# Patient Record
Sex: MALE | Race: WHITE | Employment: FULL TIME | URBAN - NONMETROPOLITAN AREA
[De-identification: names, ages, dates, MRNs, and addresses within clinical notes are randomized per-mention and may not be internally consistent; named-entity substitution may affect disease eponyms.]

---

## 2017-04-03 ENCOUNTER — HOSPITAL ENCOUNTER (EMERGENCY)
Age: 37
Discharge: HOME | End: 2017-04-03
Attending: EMERGENCY MEDICINE
Payer: COMMERCIAL

## 2017-04-03 ENCOUNTER — APPOINTMENT (OUTPATIENT)
Dept: CT IMAGING | Age: 37
End: 2017-04-03
Payer: COMMERCIAL

## 2017-04-03 VITALS
RESPIRATION RATE: 12 BRPM | TEMPERATURE: 97.2 F | DIASTOLIC BLOOD PRESSURE: 74 MMHG | OXYGEN SATURATION: 98 % | WEIGHT: 280 LBS | HEART RATE: 84 BPM | HEIGHT: 69 IN | SYSTOLIC BLOOD PRESSURE: 119 MMHG | BODY MASS INDEX: 41.47 KG/M2

## 2017-04-03 DIAGNOSIS — K52.9 GASTROENTERITIS: Primary | ICD-10-CM

## 2017-04-03 LAB
-: NORMAL
ABSOLUTE EOS #: 0.1 K/UL (ref 0–0.4)
ABSOLUTE LYMPH #: 0.5 K/UL (ref 1–4.8)
ABSOLUTE MONO #: 0.9 K/UL (ref 0.1–1.2)
ALBUMIN SERPL-MCNC: 4.2 G/DL (ref 3.5–5.2)
ALBUMIN/GLOBULIN RATIO: 1.6 (ref 1–2.5)
ALP BLD-CCNC: 78 U/L (ref 40–129)
ALT SERPL-CCNC: 15 U/L (ref 5–41)
AMORPHOUS: NORMAL
ANION GAP SERPL CALCULATED.3IONS-SCNC: 11 MMOL/L (ref 9–17)
AST SERPL-CCNC: 14 U/L
BACTERIA: NORMAL
BASOPHILS # BLD: 0 % (ref 0–2)
BASOPHILS ABSOLUTE: 0 K/UL (ref 0–0.2)
BILIRUB SERPL-MCNC: 0.54 MG/DL (ref 0.3–1.2)
BILIRUBIN DIRECT: 0.13 MG/DL
BILIRUBIN URINE: NEGATIVE
BILIRUBIN, INDIRECT: 0.41 MG/DL (ref 0–1)
BUN BLDV-MCNC: 17 MG/DL (ref 6–20)
BUN/CREAT BLD: 23 (ref 9–20)
CALCIUM SERPL-MCNC: 8.6 MG/DL (ref 8.6–10.4)
CASTS UA: NORMAL /LPF (ref 0–2)
CHLORIDE BLD-SCNC: 106 MMOL/L (ref 98–107)
CO2: 24 MMOL/L (ref 20–31)
COLOR: ABNORMAL
COMMENT UA: ABNORMAL
CREAT SERPL-MCNC: 0.73 MG/DL (ref 0.7–1.2)
CRYSTALS, UA: NORMAL /HPF
DIFFERENTIAL TYPE: ABNORMAL
EOSINOPHILS RELATIVE PERCENT: 1 % (ref 1–4)
EPITHELIAL CELLS UA: NORMAL /HPF (ref 0–5)
GFR AFRICAN AMERICAN: >60 ML/MIN
GFR NON-AFRICAN AMERICAN: >60 ML/MIN
GFR SERPL CREATININE-BSD FRML MDRD: ABNORMAL ML/MIN/{1.73_M2}
GFR SERPL CREATININE-BSD FRML MDRD: ABNORMAL ML/MIN/{1.73_M2}
GLOBULIN: 2.6 G/DL (ref 1.5–3.8)
GLUCOSE BLD-MCNC: 130 MG/DL (ref 70–99)
GLUCOSE URINE: NEGATIVE
HCT VFR BLD CALC: 48.9 % (ref 41–53)
HEMOGLOBIN: 15.7 G/DL (ref 13.5–17.5)
KETONES, URINE: NEGATIVE
LEUKOCYTE ESTERASE, URINE: NEGATIVE
LIPASE: 19 U/L (ref 13–60)
LYMPHOCYTES # BLD: 3 % (ref 24–44)
MCH RBC QN AUTO: 27.3 PG (ref 26–34)
MCHC RBC AUTO-ENTMCNC: 32 G/DL (ref 31–37)
MCV RBC AUTO: 85.3 FL (ref 80–100)
MONOCYTES # BLD: 6 % (ref 1–7)
MUCUS: NORMAL
NITRITE, URINE: NEGATIVE
OTHER OBSERVATIONS UA: NORMAL
PDW BLD-RTO: 14.5 % (ref 11–14.5)
PH UA: 5 (ref 5–6)
PLATELET # BLD: 254 K/UL (ref 140–450)
PLATELET ESTIMATE: ABNORMAL
PMV BLD AUTO: 8.2 FL (ref 6–12)
POTASSIUM SERPL-SCNC: 4 MMOL/L (ref 3.7–5.3)
PROTEIN UA: NEGATIVE
RBC # BLD: 5.74 M/UL (ref 4.5–5.9)
RBC # BLD: ABNORMAL 10*6/UL
RBC UA: NORMAL /HPF (ref 0–4)
RENAL EPITHELIAL, UA: NORMAL /HPF
SEG NEUTROPHILS: 90 % (ref 36–66)
SEGMENTED NEUTROPHILS ABSOLUTE COUNT: 14.4 K/UL (ref 1.8–7.7)
SODIUM BLD-SCNC: 141 MMOL/L (ref 135–144)
SPECIFIC GRAVITY UA: 1 (ref 1.01–1.02)
TOTAL PROTEIN: 6.8 G/DL (ref 6.4–8.3)
TRICHOMONAS: NORMAL
TURBIDITY: ABNORMAL
URINE HGB: NEGATIVE
UROBILINOGEN, URINE: NORMAL
WBC # BLD: 15.9 K/UL (ref 3.5–11)
WBC # BLD: ABNORMAL 10*3/UL
WBC UA: NORMAL /HPF (ref 0–4)
YEAST: NORMAL

## 2017-04-03 PROCEDURE — 83690 ASSAY OF LIPASE: CPT

## 2017-04-03 PROCEDURE — 36415 COLL VENOUS BLD VENIPUNCTURE: CPT

## 2017-04-03 PROCEDURE — 80048 BASIC METABOLIC PNL TOTAL CA: CPT

## 2017-04-03 PROCEDURE — 2580000003 HC RX 258: Performed by: EMERGENCY MEDICINE

## 2017-04-03 PROCEDURE — 80076 HEPATIC FUNCTION PANEL: CPT

## 2017-04-03 PROCEDURE — 96375 TX/PRO/DX INJ NEW DRUG ADDON: CPT

## 2017-04-03 PROCEDURE — 6360000002 HC RX W HCPCS: Performed by: EMERGENCY MEDICINE

## 2017-04-03 PROCEDURE — 99284 EMERGENCY DEPT VISIT MOD MDM: CPT

## 2017-04-03 PROCEDURE — 74177 CT ABD & PELVIS W/CONTRAST: CPT

## 2017-04-03 PROCEDURE — 85025 COMPLETE CBC W/AUTO DIFF WBC: CPT

## 2017-04-03 PROCEDURE — 96372 THER/PROPH/DIAG INJ SC/IM: CPT

## 2017-04-03 PROCEDURE — 74177 CT ABD & PELVIS W/CONTRAST: CPT | Performed by: RADIOLOGY

## 2017-04-03 PROCEDURE — 81001 URINALYSIS AUTO W/SCOPE: CPT

## 2017-04-03 PROCEDURE — 96374 THER/PROPH/DIAG INJ IV PUSH: CPT

## 2017-04-03 PROCEDURE — 6360000004 HC RX CONTRAST MEDICATION: Performed by: EMERGENCY MEDICINE

## 2017-04-03 RX ORDER — DIPHENHYDRAMINE HYDROCHLORIDE 50 MG/ML
25 INJECTION INTRAMUSCULAR; INTRAVENOUS ONCE
Status: COMPLETED | OUTPATIENT
Start: 2017-04-03 | End: 2017-04-03

## 2017-04-03 RX ORDER — ONDANSETRON 4 MG/1
4 TABLET, ORALLY DISINTEGRATING ORAL EVERY 8 HOURS PRN
Qty: 12 TABLET | Refills: 0 | Status: SHIPPED | OUTPATIENT
Start: 2017-04-03 | End: 2017-04-12

## 2017-04-03 RX ORDER — ONDANSETRON 2 MG/ML
4 INJECTION INTRAMUSCULAR; INTRAVENOUS ONCE
Status: COMPLETED | OUTPATIENT
Start: 2017-04-03 | End: 2017-04-03

## 2017-04-03 RX ORDER — DICYCLOMINE HYDROCHLORIDE 10 MG/ML
20 INJECTION INTRAMUSCULAR ONCE
Status: COMPLETED | OUTPATIENT
Start: 2017-04-03 | End: 2017-04-03

## 2017-04-03 RX ORDER — 0.9 % SODIUM CHLORIDE 0.9 %
1000 INTRAVENOUS SOLUTION INTRAVENOUS ONCE
Status: COMPLETED | OUTPATIENT
Start: 2017-04-03 | End: 2017-04-03

## 2017-04-03 RX ORDER — PROMETHAZINE HYDROCHLORIDE 25 MG/ML
12.5 INJECTION, SOLUTION INTRAMUSCULAR; INTRAVENOUS ONCE
Status: COMPLETED | OUTPATIENT
Start: 2017-04-03 | End: 2017-04-03

## 2017-04-03 RX ADMIN — PROMETHAZINE HYDROCHLORIDE 12.5 MG: 25 INJECTION, SOLUTION INTRAMUSCULAR; INTRAVENOUS at 18:52

## 2017-04-03 RX ADMIN — IOVERSOL 130 ML: 741 INJECTION INTRA-ARTERIAL; INTRAVENOUS at 16:50

## 2017-04-03 RX ADMIN — DICYCLOMINE HYDROCHLORIDE 20 MG: 20 INJECTION, SOLUTION INTRAMUSCULAR at 15:40

## 2017-04-03 RX ADMIN — SODIUM CHLORIDE 1000 ML: 9 INJECTION, SOLUTION INTRAVENOUS at 18:09

## 2017-04-03 RX ADMIN — DIPHENHYDRAMINE HYDROCHLORIDE 25 MG: 50 INJECTION, SOLUTION INTRAMUSCULAR; INTRAVENOUS at 18:48

## 2017-04-03 RX ADMIN — ONDANSETRON 4 MG: 2 INJECTION INTRAMUSCULAR; INTRAVENOUS at 15:39

## 2017-04-03 RX ADMIN — SODIUM CHLORIDE 1000 ML: 9 INJECTION, SOLUTION INTRAVENOUS at 15:38

## 2017-04-03 ASSESSMENT — PAIN SCALES - GENERAL
PAINLEVEL_OUTOF10: 6
PAINLEVEL_OUTOF10: 5
PAINLEVEL_OUTOF10: 9

## 2017-04-03 ASSESSMENT — PAIN DESCRIPTION - FREQUENCY: FREQUENCY: CONTINUOUS

## 2017-04-03 ASSESSMENT — PAIN DESCRIPTION - DESCRIPTORS
DESCRIPTORS: THROBBING
DESCRIPTORS: THROBBING

## 2017-04-03 ASSESSMENT — PAIN DESCRIPTION - ORIENTATION
ORIENTATION: UPPER
ORIENTATION: UPPER

## 2017-04-03 ASSESSMENT — PAIN DESCRIPTION - ONSET: ONSET: ON-GOING

## 2017-04-03 ASSESSMENT — PAIN DESCRIPTION - PAIN TYPE
TYPE: ACUTE PAIN
TYPE: ACUTE PAIN

## 2017-04-03 ASSESSMENT — PAIN DESCRIPTION - LOCATION
LOCATION: ABDOMEN
LOCATION: ABDOMEN

## 2017-04-03 ASSESSMENT — PAIN DESCRIPTION - PROGRESSION: CLINICAL_PROGRESSION: NOT CHANGED

## 2017-04-12 ENCOUNTER — OFFICE VISIT (OUTPATIENT)
Dept: FAMILY MEDICINE CLINIC | Age: 37
End: 2017-04-12
Payer: COMMERCIAL

## 2017-04-12 VITALS
HEART RATE: 75 BPM | TEMPERATURE: 97.9 F | BODY MASS INDEX: 41.53 KG/M2 | DIASTOLIC BLOOD PRESSURE: 78 MMHG | WEIGHT: 280.4 LBS | OXYGEN SATURATION: 97 % | SYSTOLIC BLOOD PRESSURE: 118 MMHG | HEIGHT: 69 IN

## 2017-04-12 DIAGNOSIS — Z00.00 VISIT FOR WELL MAN HEALTH CHECK: Primary | ICD-10-CM

## 2017-04-12 DIAGNOSIS — B35.3 TINEA PEDIS, LEFT: ICD-10-CM

## 2017-04-12 PROCEDURE — 99395 PREV VISIT EST AGE 18-39: CPT | Performed by: FAMILY MEDICINE

## 2017-04-12 RX ORDER — CLOTRIMAZOLE AND BETAMETHASONE DIPROPIONATE 10; .64 MG/G; MG/G
CREAM TOPICAL
Qty: 45 G | Refills: 1 | Status: SHIPPED | OUTPATIENT
Start: 2017-04-12 | End: 2018-10-18 | Stop reason: ALTCHOICE

## 2017-04-12 ASSESSMENT — ENCOUNTER SYMPTOMS
COUGH: 0
CHEST TIGHTNESS: 0
SHORTNESS OF BREATH: 0
CONSTIPATION: 0
WHEEZING: 0
SINUS PRESSURE: 0
DIARRHEA: 0
ABDOMINAL PAIN: 0
BACK PAIN: 0

## 2017-07-03 ENCOUNTER — HOSPITAL ENCOUNTER (EMERGENCY)
Age: 37
Discharge: HOME | End: 2017-07-03
Attending: SPECIALIST
Payer: COMMERCIAL

## 2017-07-03 ENCOUNTER — APPOINTMENT (OUTPATIENT)
Dept: GENERAL RADIOLOGY | Age: 37
End: 2017-07-03
Payer: COMMERCIAL

## 2017-07-03 VITALS
TEMPERATURE: 97.9 F | HEART RATE: 69 BPM | SYSTOLIC BLOOD PRESSURE: 119 MMHG | DIASTOLIC BLOOD PRESSURE: 82 MMHG | WEIGHT: 275 LBS | RESPIRATION RATE: 19 BRPM | BODY MASS INDEX: 40.61 KG/M2 | OXYGEN SATURATION: 97 %

## 2017-07-03 DIAGNOSIS — R07.9 CHEST PAIN, UNSPECIFIED TYPE: Primary | ICD-10-CM

## 2017-07-03 LAB
ABSOLUTE EOS #: 0.3 K/UL (ref 0–0.4)
ABSOLUTE LYMPH #: 3 K/UL (ref 1–4.8)
ABSOLUTE MONO #: 1.2 K/UL (ref 0.1–1.2)
ANION GAP SERPL CALCULATED.3IONS-SCNC: 12 MMOL/L (ref 9–17)
BASOPHILS # BLD: 1 %
BASOPHILS ABSOLUTE: 0.1 K/UL (ref 0–0.2)
BUN BLDV-MCNC: 17 MG/DL (ref 6–20)
BUN/CREAT BLD: 23 (ref 9–20)
CALCIUM SERPL-MCNC: 8.9 MG/DL (ref 8.6–10.4)
CHLORIDE BLD-SCNC: 104 MMOL/L (ref 98–107)
CO2: 25 MMOL/L (ref 20–31)
CREAT SERPL-MCNC: 0.73 MG/DL (ref 0.7–1.2)
D DIMER: <100 NG/ML
DIFFERENTIAL TYPE: ABNORMAL
EKG ATRIAL RATE: 71 BPM
EKG P AXIS: 54 DEGREES
EKG P-R INTERVAL: 140 MS
EKG Q-T INTERVAL: 380 MS
EKG QRS DURATION: 82 MS
EKG QTC CALCULATION (BAZETT): 412 MS
EKG R AXIS: 13 DEGREES
EKG T AXIS: 27 DEGREES
EKG VENTRICULAR RATE: 71 BPM
EOSINOPHILS RELATIVE PERCENT: 3 %
GFR AFRICAN AMERICAN: >60 ML/MIN
GFR NON-AFRICAN AMERICAN: >60 ML/MIN
GFR SERPL CREATININE-BSD FRML MDRD: ABNORMAL ML/MIN/{1.73_M2}
GFR SERPL CREATININE-BSD FRML MDRD: ABNORMAL ML/MIN/{1.73_M2}
GLUCOSE BLD-MCNC: 100 MG/DL (ref 70–99)
HCT VFR BLD CALC: 46.5 % (ref 41–53)
HEMOGLOBIN: 15 G/DL (ref 13.5–17.5)
LYMPHOCYTES # BLD: 21 %
MCH RBC QN AUTO: 27.4 PG (ref 26–34)
MCHC RBC AUTO-ENTMCNC: 32.3 G/DL (ref 31–37)
MCV RBC AUTO: 84.8 FL (ref 80–100)
MONOCYTES # BLD: 9 %
PDW BLD-RTO: 14.3 % (ref 11–14.5)
PLATELET # BLD: 238 K/UL (ref 140–450)
PLATELET ESTIMATE: ABNORMAL
PMV BLD AUTO: 7.9 FL (ref 6–12)
POTASSIUM SERPL-SCNC: 4.2 MMOL/L (ref 3.7–5.3)
RBC # BLD: 5.48 M/UL (ref 4.5–5.9)
RBC # BLD: ABNORMAL 10*6/UL
SEG NEUTROPHILS: 66 %
SEGMENTED NEUTROPHILS ABSOLUTE COUNT: 9.4 K/UL (ref 1.8–7.7)
SODIUM BLD-SCNC: 141 MMOL/L (ref 135–144)
TROPONIN INTERP: NORMAL
TROPONIN T: <0.03 NG/ML
WBC # BLD: 14 K/UL (ref 3.5–11)
WBC # BLD: ABNORMAL 10*3/UL

## 2017-07-03 PROCEDURE — 84484 ASSAY OF TROPONIN QUANT: CPT

## 2017-07-03 PROCEDURE — 71020 XR CHEST STANDARD TWO VW: CPT | Performed by: RADIOLOGY

## 2017-07-03 PROCEDURE — 80048 BASIC METABOLIC PNL TOTAL CA: CPT

## 2017-07-03 PROCEDURE — 6370000000 HC RX 637 (ALT 250 FOR IP): Performed by: SPECIALIST

## 2017-07-03 PROCEDURE — 71020 XR CHEST STANDARD TWO VW: CPT

## 2017-07-03 PROCEDURE — 85379 FIBRIN DEGRADATION QUANT: CPT

## 2017-07-03 PROCEDURE — 96374 THER/PROPH/DIAG INJ IV PUSH: CPT

## 2017-07-03 PROCEDURE — 93005 ELECTROCARDIOGRAM TRACING: CPT

## 2017-07-03 PROCEDURE — 99285 EMERGENCY DEPT VISIT HI MDM: CPT

## 2017-07-03 PROCEDURE — 6360000002 HC RX W HCPCS: Performed by: EMERGENCY MEDICINE

## 2017-07-03 PROCEDURE — 36415 COLL VENOUS BLD VENIPUNCTURE: CPT

## 2017-07-03 PROCEDURE — 85025 COMPLETE CBC W/AUTO DIFF WBC: CPT

## 2017-07-03 RX ORDER — IBUPROFEN 800 MG/1
800 TABLET ORAL EVERY 8 HOURS PRN
Qty: 30 TABLET | Refills: 0 | Status: SHIPPED | OUTPATIENT
Start: 2017-07-03 | End: 2018-10-18 | Stop reason: ALTCHOICE

## 2017-07-03 RX ORDER — KETOROLAC TROMETHAMINE 30 MG/ML
30 INJECTION, SOLUTION INTRAMUSCULAR; INTRAVENOUS ONCE
Status: COMPLETED | OUTPATIENT
Start: 2017-07-03 | End: 2017-07-03

## 2017-07-03 RX ORDER — ASPIRIN 81 MG/1
324 TABLET, CHEWABLE ORAL ONCE
Status: COMPLETED | OUTPATIENT
Start: 2017-07-03 | End: 2017-07-03

## 2017-07-03 RX ADMIN — ASPIRIN 81 MG 324 MG: 81 TABLET ORAL at 07:33

## 2017-07-03 RX ADMIN — KETOROLAC TROMETHAMINE 30 MG: 30 INJECTION, SOLUTION INTRAMUSCULAR at 08:50

## 2017-07-03 ASSESSMENT — PAIN SCALES - GENERAL
PAINLEVEL_OUTOF10: 5
PAINLEVEL_OUTOF10: 7

## 2017-07-03 ASSESSMENT — PAIN DESCRIPTION - ORIENTATION: ORIENTATION: LEFT

## 2017-07-03 ASSESSMENT — PAIN DESCRIPTION - DESCRIPTORS: DESCRIPTORS: SHARP

## 2017-07-03 ASSESSMENT — PAIN DESCRIPTION - LOCATION: LOCATION: CHEST

## 2018-10-18 ENCOUNTER — OFFICE VISIT (OUTPATIENT)
Dept: PRIMARY CARE CLINIC | Age: 38
End: 2018-10-18
Payer: COMMERCIAL

## 2018-10-18 VITALS
WEIGHT: 285.9 LBS | HEART RATE: 76 BPM | DIASTOLIC BLOOD PRESSURE: 84 MMHG | HEIGHT: 69 IN | BODY MASS INDEX: 42.34 KG/M2 | TEMPERATURE: 98 F | SYSTOLIC BLOOD PRESSURE: 138 MMHG

## 2018-10-18 DIAGNOSIS — L03.012 PARONYCHIA OF FINGER OF LEFT HAND: Primary | ICD-10-CM

## 2018-10-18 PROCEDURE — 1036F TOBACCO NON-USER: CPT | Performed by: NURSE PRACTITIONER

## 2018-10-18 PROCEDURE — G8417 CALC BMI ABV UP PARAM F/U: HCPCS | Performed by: NURSE PRACTITIONER

## 2018-10-18 PROCEDURE — G8484 FLU IMMUNIZE NO ADMIN: HCPCS | Performed by: NURSE PRACTITIONER

## 2018-10-18 PROCEDURE — G8427 DOCREV CUR MEDS BY ELIG CLIN: HCPCS | Performed by: NURSE PRACTITIONER

## 2018-10-18 PROCEDURE — 99213 OFFICE O/P EST LOW 20 MIN: CPT | Performed by: NURSE PRACTITIONER

## 2018-10-18 RX ORDER — CEPHALEXIN 500 MG/1
500 CAPSULE ORAL 3 TIMES DAILY
Qty: 30 CAPSULE | Refills: 0 | Status: SHIPPED | OUTPATIENT
Start: 2018-10-18 | End: 2018-10-28

## 2018-10-18 ASSESSMENT — PATIENT HEALTH QUESTIONNAIRE - PHQ9
1. LITTLE INTEREST OR PLEASURE IN DOING THINGS: 0
SUM OF ALL RESPONSES TO PHQ QUESTIONS 1-9: 0
SUM OF ALL RESPONSES TO PHQ QUESTIONS 1-9: 0
2. FEELING DOWN, DEPRESSED OR HOPELESS: 0
SUM OF ALL RESPONSES TO PHQ9 QUESTIONS 1 & 2: 0

## 2018-10-18 NOTE — PROGRESS NOTES
Subjective:      Patient ID: Dian Gonzalez is a 45 y.o. male. Right index finger cuticle swollen, red, tender. Bit nail approx 2 days ago and symptoms started shortly after. Past Medical History:   Diagnosis Date    Chronic abdominal pain     Chronic diarrhea     Constipation     Internal hemorrhoids     Obesity        Past Surgical History:   Procedure Laterality Date    APPENDECTOMY      COLONOSCOPY  09/28/2012    Internal hemorrhoids.  WISDOM TOOTH EXTRACTION         Social History     Social History    Marital status:      Spouse name: Rodrigo Gibson Number of children: 1    Years of education: N/A     Social History Main Topics    Smoking status: Former Smoker     Years: 1.00     Types: Cigarettes     Start date: 9/25/2002     Quit date: 9/25/2003    Smokeless tobacco: Never Used    Alcohol use No    Drug use: No    Sexual activity: Yes     Partners: Female      Comment: wife     Other Topics Concern    None     Social History Narrative    None       Family History   Problem Relation Age of Onset    Colon Cancer Maternal Grandfather     Obesity Father     Diabetes Father         type 2    High Blood Pressure Father     Arthritis Brother         rheumatoid    Crohn's Disease Other        Allergies   Allergen Reactions    Prednisone      Increased heart rate. Current Outpatient Prescriptions   Medication Sig Dispense Refill    cephALEXin (KEFLEX) 500 MG capsule Take 1 capsule by mouth 3 times daily for 10 days 30 capsule 0     No current facility-administered medications for this visit. Review of Systems   Skin:        Right index finger swelling       Objective:   Physical Exam   Constitutional: He is oriented to person, place, and time. He appears well-developed and well-nourished. Cardiovascular: Normal rate and regular rhythm.     Pulmonary/Chest: Effort normal and breath sounds normal.   Neurological: He is alert and oriented to person, place, and

## 2019-10-18 ENCOUNTER — OFFICE VISIT (OUTPATIENT)
Dept: FAMILY MEDICINE CLINIC | Age: 39
End: 2019-10-18
Payer: COMMERCIAL

## 2019-10-18 VITALS
SYSTOLIC BLOOD PRESSURE: 122 MMHG | RESPIRATION RATE: 12 BRPM | DIASTOLIC BLOOD PRESSURE: 80 MMHG | TEMPERATURE: 98 F | WEIGHT: 259 LBS | HEIGHT: 69 IN | HEART RATE: 70 BPM | BODY MASS INDEX: 38.36 KG/M2 | OXYGEN SATURATION: 98 %

## 2019-10-18 DIAGNOSIS — L60.0 INGROWN LEFT GREATER TOENAIL: Primary | ICD-10-CM

## 2019-10-18 DIAGNOSIS — L08.9 TOE INFECTION: ICD-10-CM

## 2019-10-18 PROCEDURE — G8427 DOCREV CUR MEDS BY ELIG CLIN: HCPCS | Performed by: NURSE PRACTITIONER

## 2019-10-18 PROCEDURE — 1036F TOBACCO NON-USER: CPT | Performed by: NURSE PRACTITIONER

## 2019-10-18 PROCEDURE — G8417 CALC BMI ABV UP PARAM F/U: HCPCS | Performed by: NURSE PRACTITIONER

## 2019-10-18 PROCEDURE — 99213 OFFICE O/P EST LOW 20 MIN: CPT | Performed by: NURSE PRACTITIONER

## 2019-10-18 PROCEDURE — G8484 FLU IMMUNIZE NO ADMIN: HCPCS | Performed by: NURSE PRACTITIONER

## 2019-10-18 RX ORDER — SULFAMETHOXAZOLE AND TRIMETHOPRIM 800; 160 MG/1; MG/1
1 TABLET ORAL 2 TIMES DAILY
Qty: 20 TABLET | Refills: 0 | Status: SHIPPED | OUTPATIENT
Start: 2019-10-18 | End: 2020-02-03

## 2019-10-18 ASSESSMENT — PATIENT HEALTH QUESTIONNAIRE - PHQ9
SUM OF ALL RESPONSES TO PHQ QUESTIONS 1-9: 0
2. FEELING DOWN, DEPRESSED OR HOPELESS: 0
SUM OF ALL RESPONSES TO PHQ QUESTIONS 1-9: 0
SUM OF ALL RESPONSES TO PHQ9 QUESTIONS 1 & 2: 0
1. LITTLE INTEREST OR PLEASURE IN DOING THINGS: 0

## 2019-10-29 ENCOUNTER — PROCEDURE VISIT (OUTPATIENT)
Dept: PODIATRY | Age: 39
End: 2019-10-29
Payer: COMMERCIAL

## 2019-10-29 VITALS
BODY MASS INDEX: 39.25 KG/M2 | WEIGHT: 265 LBS | DIASTOLIC BLOOD PRESSURE: 78 MMHG | HEIGHT: 69 IN | SYSTOLIC BLOOD PRESSURE: 132 MMHG | HEART RATE: 84 BPM

## 2019-10-29 DIAGNOSIS — L03.032 PARONYCHIA, TOE, LEFT: ICD-10-CM

## 2019-10-29 DIAGNOSIS — L60.0 OC (ONYCHOCRYPTOSIS): Primary | ICD-10-CM

## 2019-10-29 DIAGNOSIS — L30.9 DERMATITIS OF LEFT FOOT: ICD-10-CM

## 2019-10-29 DIAGNOSIS — L92.9 GRANULOMA OF GREAT TOE: ICD-10-CM

## 2019-10-29 PROCEDURE — 11750 EXCISION NAIL&NAIL MATRIX: CPT | Performed by: PODIATRIST

## 2019-10-29 PROCEDURE — G8417 CALC BMI ABV UP PARAM F/U: HCPCS | Performed by: PODIATRIST

## 2019-10-29 PROCEDURE — 1036F TOBACCO NON-USER: CPT | Performed by: PODIATRIST

## 2019-10-29 PROCEDURE — G8484 FLU IMMUNIZE NO ADMIN: HCPCS | Performed by: PODIATRIST

## 2019-10-29 PROCEDURE — 99203 OFFICE O/P NEW LOW 30 MIN: CPT | Performed by: PODIATRIST

## 2019-10-29 PROCEDURE — G8427 DOCREV CUR MEDS BY ELIG CLIN: HCPCS | Performed by: PODIATRIST

## 2019-10-29 RX ORDER — BETAMETHASONE DIPROPIONATE 0.5 MG/G
CREAM TOPICAL
Qty: 15 G | Refills: 1 | Status: SHIPPED | OUTPATIENT
Start: 2019-10-29 | End: 2020-02-03

## 2020-02-03 ENCOUNTER — OFFICE VISIT (OUTPATIENT)
Dept: PRIMARY CARE CLINIC | Age: 40
End: 2020-02-03
Payer: COMMERCIAL

## 2020-02-03 VITALS
SYSTOLIC BLOOD PRESSURE: 120 MMHG | HEART RATE: 84 BPM | OXYGEN SATURATION: 98 % | DIASTOLIC BLOOD PRESSURE: 74 MMHG | TEMPERATURE: 98.7 F | WEIGHT: 290.4 LBS | BODY MASS INDEX: 42.88 KG/M2

## 2020-02-03 LAB
INFLUENZA A ANTIBODY: ABNORMAL
INFLUENZA B ANTIBODY: ABNORMAL

## 2020-02-03 PROCEDURE — G8427 DOCREV CUR MEDS BY ELIG CLIN: HCPCS | Performed by: FAMILY MEDICINE

## 2020-02-03 PROCEDURE — G8484 FLU IMMUNIZE NO ADMIN: HCPCS | Performed by: FAMILY MEDICINE

## 2020-02-03 PROCEDURE — 99213 OFFICE O/P EST LOW 20 MIN: CPT | Performed by: FAMILY MEDICINE

## 2020-02-03 PROCEDURE — 1036F TOBACCO NON-USER: CPT | Performed by: FAMILY MEDICINE

## 2020-02-03 PROCEDURE — 87804 INFLUENZA ASSAY W/OPTIC: CPT | Performed by: FAMILY MEDICINE

## 2020-02-03 PROCEDURE — G8417 CALC BMI ABV UP PARAM F/U: HCPCS | Performed by: FAMILY MEDICINE

## 2020-02-03 ASSESSMENT — PATIENT HEALTH QUESTIONNAIRE - PHQ9
SUM OF ALL RESPONSES TO PHQ9 QUESTIONS 1 & 2: 0
SUM OF ALL RESPONSES TO PHQ QUESTIONS 1-9: 0
1. LITTLE INTEREST OR PLEASURE IN DOING THINGS: 0
2. FEELING DOWN, DEPRESSED OR HOPELESS: 0
SUM OF ALL RESPONSES TO PHQ QUESTIONS 1-9: 0

## 2020-02-03 ASSESSMENT — ENCOUNTER SYMPTOMS
ALLERGIC/IMMUNOLOGIC NEGATIVE: 1
DIARRHEA: 1
SORE THROAT: 1
RHINORRHEA: 1
COUGH: 1
EYES NEGATIVE: 1

## 2020-02-03 NOTE — PROGRESS NOTES
2/3/2020     Salome Enriquez (:  1980) is a 44 y.o. male, here for evaluation of the following medical concerns:    HPI   Acute urgent care visit for cough and cold symptoms starting Thursday with a cough. Progressing congestion and rhinorrhea after that. Upper teeth sore. Dtr. With influenza B positive last Monday. Feeling achey and tired with a fever. Not checking his temperature. Some chills early on. Some diarrhea. Past Medical History:   Diagnosis Date    Chronic abdominal pain     Chronic diarrhea     Constipation     Internal hemorrhoids     Obesity      Past Surgical History:   Procedure Laterality Date    APPENDECTOMY      COLONOSCOPY  2012    Internal hemorrhoids.  WISDOM TOOTH EXTRACTION           Review of Systems   Constitutional: Positive for chills and fever. HENT: Positive for congestion, rhinorrhea and sore throat. Eyes: Negative. Respiratory: Positive for cough. Cardiovascular: Negative. Gastrointestinal: Positive for diarrhea. Endocrine: Negative. Genitourinary: Negative. Musculoskeletal: Negative. Skin: Negative. Allergic/Immunologic: Negative. Neurological: Positive for headaches (off caffeine he thinks as the source). Hematological: Negative. Psychiatric/Behavioral: Negative.         Prior to Visit Medications    Not on File        Social History     Tobacco Use    Smoking status: Former Smoker     Years: 1.00     Types: Cigarettes     Start date: 2002     Last attempt to quit: 2003     Years since quittin.3    Smokeless tobacco: Never Used   Substance Use Topics    Alcohol use: No     Alcohol/week: 0.0 standard drinks        Vitals:    20 0910   BP: 120/74   Site: Left Upper Arm   Position: Sitting   Cuff Size: Large Adult   Pulse: 84   Temp: 98.7 °F (37.1 °C)   TempSrc: Tympanic   SpO2: 98%   Weight: 290 lb 6.4 oz (131.7 kg)     Estimated body mass index is 42.88 kg/m² as calculated from the note.    --Theo Palafox MD on 2/3/2020 at 9:41 AM

## 2020-06-25 ENCOUNTER — OFFICE VISIT (OUTPATIENT)
Dept: FAMILY MEDICINE CLINIC | Age: 40
End: 2020-06-25
Payer: COMMERCIAL

## 2020-06-25 VITALS
BODY MASS INDEX: 45.18 KG/M2 | WEIGHT: 305 LBS | TEMPERATURE: 98.2 F | SYSTOLIC BLOOD PRESSURE: 130 MMHG | HEART RATE: 72 BPM | DIASTOLIC BLOOD PRESSURE: 88 MMHG | OXYGEN SATURATION: 98 % | HEIGHT: 69 IN

## 2020-06-25 PROCEDURE — 99214 OFFICE O/P EST MOD 30 MIN: CPT | Performed by: FAMILY MEDICINE

## 2020-06-25 PROCEDURE — 1036F TOBACCO NON-USER: CPT | Performed by: FAMILY MEDICINE

## 2020-06-25 PROCEDURE — G8427 DOCREV CUR MEDS BY ELIG CLIN: HCPCS | Performed by: FAMILY MEDICINE

## 2020-06-25 PROCEDURE — G8417 CALC BMI ABV UP PARAM F/U: HCPCS | Performed by: FAMILY MEDICINE

## 2020-06-25 ASSESSMENT — ENCOUNTER SYMPTOMS
RESPIRATORY NEGATIVE: 1
DIARRHEA: 1
EYES NEGATIVE: 1
VOMITING: 0
BLOOD IN STOOL: 0
ALLERGIC/IMMUNOLOGIC NEGATIVE: 1
CONSTIPATION: 1

## 2020-06-25 ASSESSMENT — PATIENT HEALTH QUESTIONNAIRE - PHQ9
SUM OF ALL RESPONSES TO PHQ QUESTIONS 1-9: 0
2. FEELING DOWN, DEPRESSED OR HOPELESS: 0
SUM OF ALL RESPONSES TO PHQ QUESTIONS 1-9: 0
1. LITTLE INTEREST OR PLEASURE IN DOING THINGS: 0
SUM OF ALL RESPONSES TO PHQ9 QUESTIONS 1 & 2: 0

## 2020-06-25 NOTE — PROGRESS NOTES
Subjective:      Patient ID: Rickey Hernandez is a 36 y.o. male. HPI     Routine follow up on chronic medical conditions, refills, and review of updated labs. I have reviewed the patient's medical history in detail and updated the computerized patient record. IBS issues on review. He remains obese. He has gained a lot of wt. During covid 19 restrictions. Up to 319, coming back down now. No vomiting. Bowels frequently move up to 8 times a day. Workup in the past, including colonoscopy, were unrevealing. Comes and goes. Still gets some diarrhea off and on. Eating beans and saurkraut. Gave up pop. Some orange juice and 2 cups of coffee /day. He twisted his right knee about a month ago moving something in the yard. Past Medical History:   Diagnosis Date    Chronic abdominal pain     Chronic diarrhea     Constipation     Internal hemorrhoids     Obesity      Past Surgical History:   Procedure Laterality Date    APPENDECTOMY      COLONOSCOPY  2012    Internal hemorrhoids.  WISDOM TOOTH EXTRACTION       No current outpatient medications on file. No current facility-administered medications for this visit. Allergies   Allergen Reactions    Prednisone      Increased heart rate. Social History     Tobacco Use    Smoking status: Former Smoker     Years: 1.00     Types: Cigarettes     Start date: 2002     Last attempt to quit: 2003     Years since quittin.7    Smokeless tobacco: Never Used   Substance Use Topics    Alcohol use: No     Alcohol/week: 0.0 standard drinks    Drug use: No     Family History   Problem Relation Age of Onset    Colon Cancer Maternal Grandfather     Obesity Father     Diabetes Father         type 2    High Blood Pressure Father     Arthritis Brother         rheumatoid    Crohn's Disease Other          Review of Systems   Constitutional: Negative. HENT: Positive for ear pain and hearing loss (right ear. ).     Eyes: Negative. Respiratory: Negative. Cardiovascular: Negative. Gastrointestinal: Positive for constipation and diarrhea. Negative for blood in stool and vomiting. Endocrine: Negative. Genitourinary: Negative. Musculoskeletal: Positive for arthralgias. Skin: Negative. Allergic/Immunologic: Negative. Neurological: Negative. Hematological: Negative. Psychiatric/Behavioral: Negative. Objective:   Physical Exam  Constitutional:       General: He is not in acute distress. Appearance: He is well-developed. HENT:      Head: Normocephalic and atraumatic. Right Ear: External ear normal.      Left Ear: External ear normal.      Mouth/Throat:      Pharynx: No oropharyngeal exudate. Eyes:      General: No scleral icterus. Conjunctiva/sclera: Conjunctivae normal.   Neck:      Musculoskeletal: Neck supple. Thyroid: No thyromegaly. Cardiovascular:      Rate and Rhythm: Normal rate and regular rhythm. Heart sounds: Normal heart sounds. No murmur. Pulmonary:      Effort: Pulmonary effort is normal. No respiratory distress. Breath sounds: Normal breath sounds. No wheezing. Abdominal:      General: Bowel sounds are normal. There is no distension. Palpations: Abdomen is soft. Tenderness: There is no abdominal tenderness. There is no rebound. Musculoskeletal: Normal range of motion. Right knee: He exhibits normal range of motion, no swelling, no effusion and no ecchymosis. Tenderness found. Medial joint line and MCL tenderness noted. Skin:     General: Skin is warm and dry. Findings: No erythema or rash. Neurological:      Mental Status: He is alert and oriented to person, place, and time. Psychiatric:         Behavior: Behavior normal.         Thought Content:  Thought content normal.         Judgment: Judgment normal.       /88 (Site: Right Upper Arm, Position: Sitting, Cuff Size: Large Adult)   Pulse 72   Temp 98.2 °F (36.8 °C)

## 2021-01-22 ENCOUNTER — OFFICE VISIT (OUTPATIENT)
Dept: PRIMARY CARE CLINIC | Age: 41
End: 2021-01-22
Payer: COMMERCIAL

## 2021-01-22 VITALS
DIASTOLIC BLOOD PRESSURE: 89 MMHG | RESPIRATION RATE: 16 BRPM | SYSTOLIC BLOOD PRESSURE: 139 MMHG | BODY MASS INDEX: 45.88 KG/M2 | HEIGHT: 69 IN | HEART RATE: 73 BPM | TEMPERATURE: 98.4 F | OXYGEN SATURATION: 98 % | WEIGHT: 309.8 LBS

## 2021-01-22 DIAGNOSIS — J02.9 PHARYNGITIS, UNSPECIFIED ETIOLOGY: Primary | ICD-10-CM

## 2021-01-22 PROCEDURE — G8484 FLU IMMUNIZE NO ADMIN: HCPCS | Performed by: FAMILY MEDICINE

## 2021-01-22 PROCEDURE — G8417 CALC BMI ABV UP PARAM F/U: HCPCS | Performed by: FAMILY MEDICINE

## 2021-01-22 PROCEDURE — 1036F TOBACCO NON-USER: CPT | Performed by: FAMILY MEDICINE

## 2021-01-22 PROCEDURE — 99213 OFFICE O/P EST LOW 20 MIN: CPT | Performed by: FAMILY MEDICINE

## 2021-01-22 PROCEDURE — G8427 DOCREV CUR MEDS BY ELIG CLIN: HCPCS | Performed by: FAMILY MEDICINE

## 2021-01-22 RX ORDER — AMOXICILLIN 875 MG/1
875 TABLET, COATED ORAL 2 TIMES DAILY
Qty: 20 TABLET | Refills: 0 | Status: SHIPPED | OUTPATIENT
Start: 2021-01-22 | End: 2021-02-01

## 2021-01-22 ASSESSMENT — ENCOUNTER SYMPTOMS
COUGH: 1
RHINORRHEA: 0
SINUS PRESSURE: 0
DIARRHEA: 0
ABDOMINAL PAIN: 0
SHORTNESS OF BREATH: 0
NAUSEA: 0
SORE THROAT: 1
CONSTIPATION: 0
EYE REDNESS: 0
TROUBLE SWALLOWING: 0
VOMITING: 0
WHEEZING: 0
EYE DISCHARGE: 0

## 2021-01-22 ASSESSMENT — PATIENT HEALTH QUESTIONNAIRE - PHQ9: SUM OF ALL RESPONSES TO PHQ QUESTIONS 1-9: 0

## 2021-01-23 NOTE — PROGRESS NOTES
2021     Gayla Anderson (:  1980) is a 36 y.o. male, here for evaluation of the following medical concerns:    Pharyngitis  This is a new problem. The current episode started 1 to 4 weeks ago ( started with sore throat then developed slight cough). The problem occurs constantly. The problem has been unchanged. Associated symptoms include coughing and a sore throat. Pertinent negatives include no abdominal pain, arthralgias, chest pain, chills, congestion, fatigue, fever, headaches, myalgias, nausea, neck pain, rash, vomiting or weakness. Associated symptoms comments: No known covid exposure. No loss of taste or smell. . Treatments tried: has taken cough drops      Did review patient's med list, allergies, social history,pmhx and pshx today as noted in the record. Review of Systems   Constitutional: Negative for chills, fatigue and fever. HENT: Positive for sore throat. Negative for congestion, ear pain, postnasal drip, rhinorrhea, sinus pressure and trouble swallowing. Eyes: Negative for discharge and redness. Respiratory: Positive for cough. Negative for shortness of breath and wheezing. Cardiovascular: Negative for chest pain. Gastrointestinal: Negative for abdominal pain, constipation, diarrhea, nausea and vomiting. Genitourinary: Negative for dysuria, flank pain, frequency and urgency. Musculoskeletal: Negative for arthralgias, myalgias and neck pain. Skin: Negative for rash and wound. Allergic/Immunologic: Negative for environmental allergies. Neurological: Negative for dizziness, weakness, light-headedness and headaches. Hematological: Negative for adenopathy. Psychiatric/Behavioral: Negative.         Prior to Visit Medications    Not on File        Social History     Tobacco Use    Smoking status: Former Smoker     Years: 1.00     Types: Cigarettes     Start date: 2002     Quit date: 2003     Years since quittin.3    Smokeless tobacco: Never Used   Substance Use Topics    Alcohol use: No     Alcohol/week: 0.0 standard drinks        Vitals:    01/22/21 1850   BP: 139/89   Site: Right Upper Arm   Position: Sitting   Cuff Size: Large Adult   Pulse: 73   Resp: 16   Temp: 98.4 °F (36.9 °C)   TempSrc: Temporal   SpO2: 98%   Weight: (!) 309 lb 12.8 oz (140.5 kg)   Height: 5' 9\" (1.753 m)     Estimated body mass index is 45.75 kg/m² as calculated from the following:    Height as of this encounter: 5' 9\" (1.753 m). Weight as of this encounter: 309 lb 12.8 oz (140.5 kg). Physical Exam  Vitals signs and nursing note reviewed. Constitutional:       General: He is not in acute distress. Appearance: Normal appearance. He is well-developed. He is not diaphoretic. HENT:      Head: Normocephalic and atraumatic. Right Ear: External ear normal.      Left Ear: External ear normal.      Ears:      Comments: TMs dull with fluid behind the TM     Nose: No congestion or rhinorrhea. Mouth/Throat:      Pharynx: Posterior oropharyngeal erythema present. Comments: Oropharyngeal erythema noted. No exudate  Eyes:      General: No scleral icterus. Right eye: No discharge. Left eye: No discharge. Conjunctiva/sclera: Conjunctivae normal.      Pupils: Pupils are equal, round, and reactive to light. Neck:      Musculoskeletal: Normal range of motion and neck supple. Thyroid: No thyromegaly. Cardiovascular:      Rate and Rhythm: Normal rate and regular rhythm. Heart sounds: Normal heart sounds. Pulmonary:      Effort: Pulmonary effort is normal. No respiratory distress. Breath sounds: Normal breath sounds. No wheezing. Lymphadenopathy:      Cervical: Cervical adenopathy present. Skin:     General: Skin is warm and dry. Findings: No rash. Neurological:      Mental Status: He is alert and oriented to person, place, and time.    Psychiatric:         Behavior: Behavior normal.         Thought Content: Thought content normal.         Judgment: Judgment normal.         ASSESSMENT/PLAN:  Encounter Diagnosis   Name Primary?  Pharyngitis, unspecified etiology Yes     Orders Placed This Encounter   Medications    amoxicillin (AMOXIL) 875 MG tablet     Sig: Take 1 tablet by mouth 2 times daily for 10 days     Dispense:  20 tablet     Refill:  0     RX as above. Low suspicion for covid as patient has had symptoms for more than 3 weeks and has just primarily had persistent sore throat. Has had similar symptoms in the past which has only resolved with antibiotic therapy. Tylenol/Motrin prn    Increase fluids and rest    Return  if no improvement in symptoms or if any further symptoms arise.;      No follow-ups on file. An electronic signature was used to authenticate this note.     --Angelito Potts DO on 1/22/2021 at 7:15 PM

## 2022-09-05 ENCOUNTER — APPOINTMENT (OUTPATIENT)
Dept: GENERAL RADIOLOGY | Age: 42
End: 2022-09-05
Payer: COMMERCIAL

## 2022-09-05 ENCOUNTER — HOSPITAL ENCOUNTER (EMERGENCY)
Age: 42
Discharge: HOME | End: 2022-09-05
Attending: EMERGENCY MEDICINE
Payer: COMMERCIAL

## 2022-09-05 VITALS
BODY MASS INDEX: 44.43 KG/M2 | WEIGHT: 300 LBS | TEMPERATURE: 98 F | SYSTOLIC BLOOD PRESSURE: 161 MMHG | HEIGHT: 69 IN | RESPIRATION RATE: 18 BRPM | DIASTOLIC BLOOD PRESSURE: 98 MMHG | HEART RATE: 92 BPM | OXYGEN SATURATION: 96 %

## 2022-09-05 DIAGNOSIS — J40 BRONCHITIS: Primary | ICD-10-CM

## 2022-09-05 LAB
SARS-COV-2, RAPID: NOT DETECTED
SPECIMEN DESCRIPTION: NORMAL

## 2022-09-05 PROCEDURE — 71045 X-RAY EXAM CHEST 1 VIEW: CPT

## 2022-09-05 PROCEDURE — 99284 EMERGENCY DEPT VISIT MOD MDM: CPT | Performed by: EMERGENCY MEDICINE

## 2022-09-05 PROCEDURE — 94640 AIRWAY INHALATION TREATMENT: CPT

## 2022-09-05 PROCEDURE — 87635 SARS-COV-2 COVID-19 AMP PRB: CPT

## 2022-09-05 PROCEDURE — 6360000002 HC RX W HCPCS: Performed by: EMERGENCY MEDICINE

## 2022-09-05 RX ORDER — ALBUTEROL SULFATE 90 UG/1
AEROSOL, METERED RESPIRATORY (INHALATION)
Status: DISCONTINUED
Start: 2022-09-05 | End: 2022-09-06 | Stop reason: HOSPADM

## 2022-09-05 RX ORDER — ALBUTEROL SULFATE 90 UG/1
1-2 AEROSOL, METERED RESPIRATORY (INHALATION) EVERY 4 HOURS PRN
Qty: 18 G | Refills: 0 | Status: SHIPPED | OUTPATIENT
Start: 2022-09-05

## 2022-09-05 RX ORDER — ALBUTEROL SULFATE 2.5 MG/3ML
2.5 SOLUTION RESPIRATORY (INHALATION) ONCE
Status: COMPLETED | OUTPATIENT
Start: 2022-09-05 | End: 2022-09-05

## 2022-09-05 RX ADMIN — ALBUTEROL SULFATE 2.5 MG: 2.5 SOLUTION RESPIRATORY (INHALATION) at 21:42

## 2022-09-05 ASSESSMENT — PAIN DESCRIPTION - FREQUENCY: FREQUENCY: CONTINUOUS

## 2022-09-05 ASSESSMENT — PAIN DESCRIPTION - LOCATION: LOCATION: THROAT

## 2022-09-05 ASSESSMENT — PAIN DESCRIPTION - PAIN TYPE: TYPE: ACUTE PAIN

## 2022-09-05 ASSESSMENT — PAIN - FUNCTIONAL ASSESSMENT
PAIN_FUNCTIONAL_ASSESSMENT: NONE - DENIES PAIN
PAIN_FUNCTIONAL_ASSESSMENT: 0-10

## 2022-09-05 ASSESSMENT — PAIN SCALES - GENERAL: PAINLEVEL_OUTOF10: 4

## 2022-09-05 ASSESSMENT — PAIN DESCRIPTION - DESCRIPTORS: DESCRIPTORS: ACHING

## 2022-09-06 NOTE — ED TRIAGE NOTES
Pt to ed with multiple complaints. Pt sts fatigue, sore throat, and sob with exertion. Pt sts onset last pm worsening throughout today. Pt deniesn/v.  Sts diarrhea onset today.

## 2022-09-06 NOTE — ED PROVIDER NOTES
eMERGENCY dEPARTMENT eNCOUnter      Pt Name: Ros Welch  MRN: [id number]  [place] 1980  Date of evaluation: 2022      CHIEF COMPLAINT       Chief Complaint   Patient presents with    Fatigue    Cough    Pharyngitis         HISTORY OF PRESENT ILLNESS    Ros Welch is a 43 y.o. male who presents with cough and shortness of breath. Patient states that he started last night she started feeling with a cough occasionally productive states he feels like he has been running a marathon no chest pain no fever chills no sick contacts        REVIEW OF SYSTEMS       Review of systems are all reviewed and negative except stated above in HPI    PAST MEDICAL HISTORY    has a past medical history of Chronic abdominal pain, Chronic diarrhea, Constipation, Internal hemorrhoids, and Obesity. SURGICAL HISTORY      has a past surgical history that includes Appendectomy; [place] tooth extraction; and Colonoscopy (2012). CURRENT MEDICATIONS       Previous Medications    No medications on file       ALLERGIES     is allergic to prednisone. FAMILY HISTORY     He indicated that his mother is alive. He indicated that his father is alive. He indicated that his sister is alive. He indicated that all of his three brothers are alive. He indicated that his maternal grandmother is . He indicated that his maternal grandfather is . He indicated that his paternal grandmother is alive. He indicated that his paternal grandfather is . He indicated that all of his four children are alive. He indicated that the status of his other is unknown.     family history includes Arthritis in his brother; [place] in his maternal grandfather; Crohn's Disease in an other family member; Diabetes in his father; High Blood Pressure in his father; Obesity in his father. SOCIAL HISTORY      reports that he quit smoking about 18 years ago. His smoking use included cigarettes.  He started smoking about 19 years ago. He has never used smokeless tobacco. He reports that he does not drink alcohol and does not use drugs. PHYSICAL EXAM     INITIAL VITALS:  height is 5' 9\" (1.753 m) and weight is 300 lb (136.1 kg). His tympanic temperature is 98 °F (36.7 °C). His blood pressure is 161/98 (abnormal) and his pulse is 92. His respiration is 18 and oxygen saturation is 96%. General: Patient alert nontoxic-appearing male in no apparent distress  HEENT: Head is atraumatic conjunctiva are clear  Respiratory: Patient has a few little scattered expiratory wheezes no rales are appreciated  Cardiac: Heart is regular rate and rhythm  Peripheral exam no cyanosis or edema    DIFFERENTIAL DIAGNOSIS/ MDM:     Obtain x-ray COVID    DIAGNOSTIC RESULTS     EKG: All EKG's are interpreted by the Emergency Department Physician who either signs or Co-signs this chart in the absence of a cardiologist.        RADIOLOGY:   I directly visualized the following  images and reviewed the radiologist interpretations:  XR CHEST PORTABLE   Final Result   No acute process.                LABS:  Labs Reviewed   COVID-19, RAPID         EMERGENCY DEPARTMENT COURSE:   Vitals:    Vitals:    09/05/22 2129 09/05/22 2130 09/05/22 2142   BP: (!) 161/98     Pulse: 92     Resp: 18     Temp: 98 °F (36.7 °C)     TempSrc: Tympanic     SpO2: 96%  96%   Weight:  300 lb (136.1 kg)    Height:  5' 9\" (1.753 m)      -------------------------  BP: (!) 161/98, Temp: 98 °F (36.7 °C), Heart Rate: 92, Resp: 18    Orders Placed This Encounter   Medications    albuterol (PROVENTIL) nebulizer solution 2.5 mg     Order Specific Question:   Initiate RT Bronchodilator Protocol     Answer:   No    albuterol sulfate HFA (PROVENTIL HFA) 108 (90 Base) MCG/ACT inhaler     Sig: Inhale 1-2 puffs into the lungs every 4 hours as needed for Wheezing     Dispense:  18 g     Refill:  0           Re-evaluation Notes    Was given albuterol here he states he was actually feeling better reevaluation  Showed him to be clear without any wheezes no distress x-ray is negative per radiologist [place] is negative this time findings are most consistent with bronchitis patient will be given prescription for albuterol follow-up as directed return if worse    CRITICAL CARE:   None      CONSULTS:      PROCEDURES:  None    FINAL IMPRESSION      1. Bronchitis          DISPOSITION/PLAN   DISPOSITION Decision To Discharge 09/05/2022 10:27:16 PM      Condition on Disposition    Stable    PATIENT REFERRED TO:  Cierra Alejo MD  [address] [place] [address]  [phone]    In 2 days        DISCHARGE MEDICATIONS:  New Prescriptions    ALBUTEROL SULFATE HFA (PROVENTIL HFA) 108 (90 BASE) MCG/ACT INHALER    Inhale 1-2 puffs into the lungs every 4 hours as needed for Wheezing       (Please note that portions of this note were completed with a voice recognition program.  Efforts were made to edit the dictations but occasionally words are mis-transcribed.)    Sidney Lopez MD,, MD, F.A.C.E.P.   Attending Emergency Physician        Sidney Lopez MD  09/05/22 [id number]

## 2022-09-07 ENCOUNTER — OFFICE VISIT (OUTPATIENT)
Dept: FAMILY MEDICINE CLINIC | Age: 42
End: 2022-09-07
Payer: COMMERCIAL

## 2022-09-07 VITALS
BODY MASS INDEX: 44.28 KG/M2 | SYSTOLIC BLOOD PRESSURE: 156 MMHG | DIASTOLIC BLOOD PRESSURE: 94 MMHG | OXYGEN SATURATION: 98 % | HEART RATE: 76 BPM | WEIGHT: 299 LBS | TEMPERATURE: 98.4 F | HEIGHT: 69 IN

## 2022-09-07 DIAGNOSIS — J40 BRONCHITIS WITH ACUTE WHEEZING: Primary | ICD-10-CM

## 2022-09-07 PROCEDURE — 1036F TOBACCO NON-USER: CPT | Performed by: FAMILY MEDICINE

## 2022-09-07 PROCEDURE — G8427 DOCREV CUR MEDS BY ELIG CLIN: HCPCS | Performed by: FAMILY MEDICINE

## 2022-09-07 PROCEDURE — 99214 OFFICE O/P EST MOD 30 MIN: CPT | Performed by: FAMILY MEDICINE

## 2022-09-07 PROCEDURE — G8417 CALC BMI ABV UP PARAM F/U: HCPCS | Performed by: FAMILY MEDICINE

## 2022-09-07 RX ORDER — AZITHROMYCIN 250 MG/1
250 TABLET, FILM COATED ORAL SEE ADMIN INSTRUCTIONS
Qty: 6 TABLET | Refills: 0 | Status: SHIPPED | OUTPATIENT
Start: 2022-09-07 | End: 2022-09-12

## 2022-09-07 RX ORDER — DEXAMETHASONE 4 MG/1
4 TABLET ORAL 2 TIMES DAILY WITH MEALS
Qty: 20 TABLET | Refills: 0 | Status: SHIPPED | OUTPATIENT
Start: 2022-09-07 | End: 2022-09-17

## 2022-09-07 SDOH — ECONOMIC STABILITY: FOOD INSECURITY: WITHIN THE PAST 12 MONTHS, YOU WORRIED THAT YOUR FOOD WOULD RUN OUT BEFORE YOU GOT MONEY TO BUY MORE.: NEVER TRUE

## 2022-09-07 SDOH — ECONOMIC STABILITY: FOOD INSECURITY: WITHIN THE PAST 12 MONTHS, THE FOOD YOU BOUGHT JUST DIDN'T LAST AND YOU DIDN'T HAVE MONEY TO GET MORE.: NEVER TRUE

## 2022-09-07 ASSESSMENT — PATIENT HEALTH QUESTIONNAIRE - PHQ9
1. LITTLE INTEREST OR PLEASURE IN DOING THINGS: 0
SUM OF ALL RESPONSES TO PHQ QUESTIONS 1-9: 0
SUM OF ALL RESPONSES TO PHQ QUESTIONS 1-9: 0
SUM OF ALL RESPONSES TO PHQ9 QUESTIONS 1 & 2: 0
2. FEELING DOWN, DEPRESSED OR HOPELESS: 0
SUM OF ALL RESPONSES TO PHQ QUESTIONS 1-9: 0
SUM OF ALL RESPONSES TO PHQ QUESTIONS 1-9: 0

## 2022-09-07 ASSESSMENT — ENCOUNTER SYMPTOMS
EYES NEGATIVE: 1
GASTROINTESTINAL NEGATIVE: 1
SHORTNESS OF BREATH: 0
SORE THROAT: 1
COUGH: 1
WHEEZING: 0
RHINORRHEA: 0

## 2022-09-07 ASSESSMENT — SOCIAL DETERMINANTS OF HEALTH (SDOH): HOW HARD IS IT FOR YOU TO PAY FOR THE VERY BASICS LIKE FOOD, HOUSING, MEDICAL CARE, AND HEATING?: NOT HARD AT ALL

## 2022-09-07 NOTE — PROGRESS NOTES
Subjective:      Patient ID: Warner Lopez is a 43 y.o. male. HPI  acute ER follow up for 9/5/22 visit . Bronchitis diagnosis. Also noted to have elevated bp in the ER. Bp checked once a year at work. Positive family history of htn. Seeing some headache since Monday. Started Monday am with cough, weakness/fatigue. Some headache. Productive cough progressing rapidly that day and some sob prompting ER visit. Covid negative. Cxr negative. No treatment outside nebulizer treatment in ER. Feeling some better today , headache still there. Cough lighter, still productive of green phlegm. Past Medical History:   Diagnosis Date    Chronic abdominal pain     Chronic diarrhea     Constipation     Internal hemorrhoids     Obesity      Past Surgical History:   Procedure Laterality Date    APPENDECTOMY      COLONOSCOPY  09/28/2012    Internal hemorrhoids. WISDOM TOOTH EXTRACTION       Current Outpatient Medications   Medication Sig Dispense Refill    albuterol sulfate HFA (PROVENTIL HFA) 108 (90 Base) MCG/ACT inhaler Inhale 1-2 puffs into the lungs every 4 hours as needed for Wheezing 18 g 0     No current facility-administered medications for this visit. Allergies   Allergen Reactions    Prednisone      Increased heart rate. Review of Systems   Constitutional:  Positive for fatigue. Negative for fever. HENT:  Positive for sore throat. Negative for congestion and rhinorrhea. Eyes: Negative. Respiratory:  Positive for cough. Negative for shortness of breath and wheezing. Cardiovascular: Negative. Gastrointestinal: Negative. Genitourinary: Negative. Musculoskeletal: Negative. Skin: Negative. Neurological: Negative. Psychiatric/Behavioral: Negative. Objective:   Physical Exam  Constitutional:       Appearance: He is well-developed. HENT:      Head: Normocephalic and atraumatic.       Right Ear: Tympanic membrane normal.      Left Ear: Tympanic membrane normal.   Eyes:      Pupils: Pupils are equal, round, and reactive to light. Cardiovascular:      Rate and Rhythm: Normal rate and regular rhythm. Heart sounds: Normal heart sounds. No murmur heard. Pulmonary:      Effort: Pulmonary effort is normal. No respiratory distress. Breath sounds: Normal breath sounds. No wheezing or rales. Abdominal:      General: There is no distension. Palpations: Abdomen is soft. There is no mass. Tenderness: There is no abdominal tenderness. Musculoskeletal:         General: No tenderness. Normal range of motion. Cervical back: Normal range of motion and neck supple. Skin:     General: Skin is warm. Findings: No erythema or rash. Neurological:      Mental Status: He is alert. Psychiatric:         Behavior: Behavior normal.         Thought Content: Thought content normal.         Judgment: Judgment normal.     BP (!) 156/94 (Site: Left Upper Arm, Position: Sitting, Cuff Size: Large Adult)   Pulse 76   Temp 98.4 °F (36.9 °C) (Tympanic)   Ht 5' 9\" (1.753 m)   Wt 299 lb (135.6 kg)   SpO2 98%   BMI 44.15 kg/m²     Assessment:      Acute uri. Fairly rapid onset in one day, with productive cough the same day. Active wheezing at present. No prior history of rad  Covid negative in the er. Plan:      Zpak x 5 days     Decadron 4mg/day x 5 days. He has had rapid heart rate with prednisone in the past.  Willing to re trial corticosteroid . Needs help with bronchospasm and wheezing. Cont. Albuterol inhaler prn. Htn: acute elevation in the ER. Historically normal.  Due to acute illness/albuterol use, planning to wait until after the illness resolved . Work note. Recheck 2 weeks.     Sabina Gloria MD

## 2022-09-26 ENCOUNTER — OFFICE VISIT (OUTPATIENT)
Dept: FAMILY MEDICINE CLINIC | Age: 42
End: 2022-09-26
Payer: COMMERCIAL

## 2022-09-26 VITALS
HEIGHT: 69 IN | WEIGHT: 300 LBS | HEART RATE: 72 BPM | BODY MASS INDEX: 44.43 KG/M2 | SYSTOLIC BLOOD PRESSURE: 136 MMHG | DIASTOLIC BLOOD PRESSURE: 74 MMHG

## 2022-09-26 DIAGNOSIS — I10 HYPERTENSION, UNSPECIFIED TYPE: ICD-10-CM

## 2022-09-26 DIAGNOSIS — J40 BRONCHITIS WITH ACUTE WHEEZING: Primary | ICD-10-CM

## 2022-09-26 PROCEDURE — 99214 OFFICE O/P EST MOD 30 MIN: CPT | Performed by: FAMILY MEDICINE

## 2022-09-26 PROCEDURE — G8427 DOCREV CUR MEDS BY ELIG CLIN: HCPCS | Performed by: FAMILY MEDICINE

## 2022-09-26 PROCEDURE — G8417 CALC BMI ABV UP PARAM F/U: HCPCS | Performed by: FAMILY MEDICINE

## 2022-09-26 PROCEDURE — 1036F TOBACCO NON-USER: CPT | Performed by: FAMILY MEDICINE

## 2022-09-26 ASSESSMENT — PATIENT HEALTH QUESTIONNAIRE - PHQ9
SUM OF ALL RESPONSES TO PHQ QUESTIONS 1-9: 0
1. LITTLE INTEREST OR PLEASURE IN DOING THINGS: 0
SUM OF ALL RESPONSES TO PHQ QUESTIONS 1-9: 0
SUM OF ALL RESPONSES TO PHQ9 QUESTIONS 1 & 2: 0
2. FEELING DOWN, DEPRESSED OR HOPELESS: 0

## 2022-09-26 ASSESSMENT — ENCOUNTER SYMPTOMS
RESPIRATORY NEGATIVE: 1
SHORTNESS OF BREATH: 0
WHEEZING: 0
EYES NEGATIVE: 1
GASTROINTESTINAL NEGATIVE: 1
RHINORRHEA: 0

## 2022-09-26 NOTE — PROGRESS NOTES
Subjective:      Patient ID: Dean Alva is a 43 y.o. male. HPI  follow up on bronchitis and hypertension. Er visit 9/5/22. Records reviewed. Treated with zpak and decadron with improvement. Nothing recurrent. No start for htn. Improved at follow up. Considering acute illness to be a source for acute htn. Not checking bp over the interval.     Past Medical History:   Diagnosis Date    Chronic abdominal pain     Chronic diarrhea     Constipation     Internal hemorrhoids     Obesity      Past Surgical History:   Procedure Laterality Date    APPENDECTOMY      COLONOSCOPY  09/28/2012    Internal hemorrhoids. WISDOM TOOTH EXTRACTION       Current Outpatient Medications   Medication Sig Dispense Refill    albuterol sulfate HFA (PROVENTIL HFA) 108 (90 Base) MCG/ACT inhaler Inhale 1-2 puffs into the lungs every 4 hours as needed for Wheezing 18 g 0     No current facility-administered medications for this visit. Allergies   Allergen Reactions    Prednisone      Increased heart rate. Review of Systems   Constitutional: Negative. Negative for fever. HENT: Negative. Negative for congestion and rhinorrhea. Eyes: Negative. Respiratory: Negative. Negative for shortness of breath and wheezing. Cardiovascular: Negative. Gastrointestinal: Negative. Genitourinary: Negative. Musculoskeletal: Negative. Skin: Negative. Neurological: Negative. Psychiatric/Behavioral: Negative. Objective:   Physical Exam  Constitutional:       Appearance: He is well-developed. HENT:      Head: Normocephalic and atraumatic. Eyes:      Pupils: Pupils are equal, round, and reactive to light. Cardiovascular:      Rate and Rhythm: Normal rate and regular rhythm. Heart sounds: Normal heart sounds. No murmur heard. Pulmonary:      Effort: Pulmonary effort is normal. No respiratory distress. Breath sounds: Normal breath sounds. No wheezing or rales.    Abdominal:

## 2023-09-24 ENCOUNTER — HOSPITAL ENCOUNTER (EMERGENCY)
Age: 43
Discharge: HOME | End: 2023-09-24
Attending: EMERGENCY MEDICINE
Payer: COMMERCIAL

## 2023-09-24 ENCOUNTER — APPOINTMENT (OUTPATIENT)
Dept: GENERAL RADIOLOGY | Age: 43
End: 2023-09-24
Payer: COMMERCIAL

## 2023-09-24 VITALS
RESPIRATION RATE: 15 BRPM | OXYGEN SATURATION: 98 % | BODY MASS INDEX: 42.95 KG/M2 | HEART RATE: 77 BPM | DIASTOLIC BLOOD PRESSURE: 101 MMHG | SYSTOLIC BLOOD PRESSURE: 177 MMHG | TEMPERATURE: 99.4 F | HEIGHT: 69 IN | WEIGHT: 290 LBS

## 2023-09-24 DIAGNOSIS — S82.891A CLOSED FRACTURE OF RIGHT ANKLE, INITIAL ENCOUNTER: Primary | ICD-10-CM

## 2023-09-24 PROCEDURE — 73610 X-RAY EXAM OF ANKLE: CPT

## 2023-09-24 PROCEDURE — 99283 EMERGENCY DEPT VISIT LOW MDM: CPT

## 2023-09-24 PROCEDURE — 73630 X-RAY EXAM OF FOOT: CPT

## 2023-09-24 PROCEDURE — 6370000000 HC RX 637 (ALT 250 FOR IP): Performed by: EMERGENCY MEDICINE

## 2023-09-24 RX ORDER — IBUPROFEN 800 MG/1
800 TABLET ORAL ONCE
Status: COMPLETED | OUTPATIENT
Start: 2023-09-24 | End: 2023-09-24

## 2023-09-24 RX ORDER — IBUPROFEN 800 MG/1
800 TABLET ORAL EVERY 8 HOURS PRN
Qty: 30 TABLET | Refills: 0 | Status: SHIPPED | OUTPATIENT
Start: 2023-09-24

## 2023-09-24 RX ADMIN — IBUPROFEN 800 MG: 800 TABLET, FILM COATED ORAL at 19:24

## 2023-09-24 ASSESSMENT — ENCOUNTER SYMPTOMS
TROUBLE SWALLOWING: 0
ABDOMINAL PAIN: 0
SORE THROAT: 0
SHORTNESS OF BREATH: 0
VOMITING: 0
DIARRHEA: 0
BACK PAIN: 0
NAUSEA: 0
WHEEZING: 0

## 2023-09-24 ASSESSMENT — LIFESTYLE VARIABLES
HOW OFTEN DO YOU HAVE A DRINK CONTAINING ALCOHOL: NEVER
HOW MANY STANDARD DRINKS CONTAINING ALCOHOL DO YOU HAVE ON A TYPICAL DAY: PATIENT DOES NOT DRINK

## 2023-09-24 ASSESSMENT — PAIN SCALES - GENERAL
PAINLEVEL_OUTOF10: 7
PAINLEVEL_OUTOF10: 6
PAINLEVEL_OUTOF10: 8

## 2023-09-24 ASSESSMENT — PAIN DESCRIPTION - LOCATION
LOCATION: ANKLE
LOCATION: ANKLE

## 2023-09-24 ASSESSMENT — PAIN - FUNCTIONAL ASSESSMENT: PAIN_FUNCTIONAL_ASSESSMENT: 0-10

## 2023-09-24 ASSESSMENT — PAIN DESCRIPTION - ORIENTATION: ORIENTATION: RIGHT

## 2023-09-24 NOTE — ED PROVIDER NOTES
[facility]  eMERGENCY dEPARTMENT eNCOUnter      Pt Name: Taj Hdz  MRN: [id number]  [address] [facility] [place] 1980  Date of evaluation: 9/24/2023      CHIEF COMPLAINT       Chief Complaint   Patient presents with    Ankle Pain     Right          HISTORY OF PRESENT ILLNESS    Taj Hdz is a 37 y.o. male who presents with chief complaint of right ankle pain, patient said he injured it this morning he was going out to get his some water and he slipped on the wet grass his leg went behind him he heard a crack he has been able to walk since but is getting worse the pain across the dorsum of his foot and also the lateral aspect of his ankle that radiates up his leg but he says his knee does not hurt he has been able to ambulate with some discomfort      REVIEW OF SYSTEMS         Review of Systems   Constitutional:  Negative for chills and fever. HENT:  Negative for congestion, dental problem, sore throat and trouble swallowing. Eyes:  Negative for visual disturbance. Respiratory:  Negative for shortness of breath and wheezing. Cardiovascular:  Negative for chest pain, palpitations and leg swelling. Gastrointestinal:  Negative for abdominal pain, diarrhea, nausea and vomiting. Musculoskeletal:  Negative for back pain, joint swelling and neck pain. Right ankle and foot pain   Skin:  Negative for rash. Neurological:  Negative for dizziness, syncope, weakness and headaches. Hematological:  Negative for adenopathy. Does not bruise/bleed easily. Psychiatric/Behavioral:  Negative for confusion and suicidal ideas. PAST MEDICAL HISTORY    has a past medical history of Chronic abdominal pain, Chronic diarrhea, Constipation, Internal hemorrhoids, and Obesity. SURGICAL HISTORY      has a past surgical history that includes Appendectomy; [place] tooth extraction; and Colonoscopy (09/28/2012).     CURRENT MEDICATIONS       Previous Medications    ALBUTEROL SULFATE HFA (PROVENTIL

## 2023-09-27 ENCOUNTER — OFFICE VISIT (OUTPATIENT)
Dept: FAMILY MEDICINE CLINIC | Age: 43
End: 2023-09-27
Payer: COMMERCIAL

## 2023-09-27 VITALS
HEIGHT: 69 IN | HEART RATE: 68 BPM | WEIGHT: 295.6 LBS | OXYGEN SATURATION: 97 % | DIASTOLIC BLOOD PRESSURE: 80 MMHG | BODY MASS INDEX: 43.78 KG/M2 | SYSTOLIC BLOOD PRESSURE: 132 MMHG

## 2023-09-27 DIAGNOSIS — Z00.00 PREVENTATIVE HEALTH CARE: ICD-10-CM

## 2023-09-27 DIAGNOSIS — S82.891D CLOSED AVULSION FRACTURE OF RIGHT ANKLE WITH ROUTINE HEALING, SUBSEQUENT ENCOUNTER: ICD-10-CM

## 2023-09-27 DIAGNOSIS — S93.491D SPRAIN OF ANTERIOR TALOFIBULAR LIGAMENT OF RIGHT ANKLE, SUBSEQUENT ENCOUNTER: Primary | ICD-10-CM

## 2023-09-27 PROCEDURE — 1036F TOBACCO NON-USER: CPT | Performed by: FAMILY MEDICINE

## 2023-09-27 PROCEDURE — G8427 DOCREV CUR MEDS BY ELIG CLIN: HCPCS | Performed by: FAMILY MEDICINE

## 2023-09-27 PROCEDURE — 99214 OFFICE O/P EST MOD 30 MIN: CPT | Performed by: FAMILY MEDICINE

## 2023-09-27 PROCEDURE — G8417 CALC BMI ABV UP PARAM F/U: HCPCS | Performed by: FAMILY MEDICINE

## 2023-09-27 SDOH — ECONOMIC STABILITY: FOOD INSECURITY: WITHIN THE PAST 12 MONTHS, YOU WORRIED THAT YOUR FOOD WOULD RUN OUT BEFORE YOU GOT MONEY TO BUY MORE.: NEVER TRUE

## 2023-09-27 SDOH — ECONOMIC STABILITY: FOOD INSECURITY: WITHIN THE PAST 12 MONTHS, THE FOOD YOU BOUGHT JUST DIDN'T LAST AND YOU DIDN'T HAVE MONEY TO GET MORE.: NEVER TRUE

## 2023-09-27 SDOH — ECONOMIC STABILITY: INCOME INSECURITY: HOW HARD IS IT FOR YOU TO PAY FOR THE VERY BASICS LIKE FOOD, HOUSING, MEDICAL CARE, AND HEATING?: NOT HARD AT ALL

## 2023-09-27 SDOH — ECONOMIC STABILITY: HOUSING INSECURITY
IN THE LAST 12 MONTHS, WAS THERE A TIME WHEN YOU DID NOT HAVE A STEADY PLACE TO SLEEP OR SLEPT IN A SHELTER (INCLUDING NOW)?: NO

## 2023-09-27 ASSESSMENT — ENCOUNTER SYMPTOMS
GASTROINTESTINAL NEGATIVE: 1
RESPIRATORY NEGATIVE: 1
EYES NEGATIVE: 1

## 2023-09-27 ASSESSMENT — PATIENT HEALTH QUESTIONNAIRE - PHQ9
SUM OF ALL RESPONSES TO PHQ QUESTIONS 1-9: 0
SUM OF ALL RESPONSES TO PHQ QUESTIONS 1-9: 0
2. FEELING DOWN, DEPRESSED OR HOPELESS: 0
SUM OF ALL RESPONSES TO PHQ QUESTIONS 1-9: 0
SUM OF ALL RESPONSES TO PHQ9 QUESTIONS 1 & 2: 0
SUM OF ALL RESPONSES TO PHQ QUESTIONS 1-9: 0
1. LITTLE INTEREST OR PLEASURE IN DOING THINGS: 0

## 2023-09-27 NOTE — PROGRESS NOTES
Subjective:      Patient ID: Heather Marsh is a 37 y.o. male. HPI  scheduled follow up on recent ed visit . Right ankle injury 9/24/23 after slipping in the wet grass. Right leg folded under him and he felt a crack or pop at the time. Unable to bear wt after. Laid down for awhile, but struggled to get out of bed. Pain radiating up the leg . Bp incidentally elevated to 177/101. XR ANKLE RIGHT (MIN 3 VIEWS)   Preliminary Result   1. Soft tissue swelling over the lateral malleolus with a faint bony fragment   likely representing a subtle avulsion injury. 2. No acute right foot fractures or malalignment. XR FOOT RIGHT (MIN 3 VIEWS)   Preliminary Result   1. Soft tissue swelling over the lateral malleolus with a faint bony fragment   likely representing a subtle avulsion injury. 2. No acute right foot fractures or malalignment. Past Medical History:   Diagnosis Date    Chronic abdominal pain     Chronic diarrhea     Constipation     Internal hemorrhoids     Obesity      Past Surgical History:   Procedure Laterality Date    APPENDECTOMY      COLONOSCOPY  09/28/2012    Internal hemorrhoids. WISDOM TOOTH EXTRACTION       Current Outpatient Medications   Medication Sig Dispense Refill    ibuprofen (ADVIL;MOTRIN) 800 MG tablet Take 1 tablet by mouth every 8 hours as needed for Pain 30 tablet 0    albuterol sulfate HFA (PROVENTIL HFA) 108 (90 Base) MCG/ACT inhaler Inhale 1-2 puffs into the lungs every 4 hours as needed for Wheezing 18 g 0     No current facility-administered medications for this visit. Allergies   Allergen Reactions    Prednisone      Increased heart rate. Review of Systems   Constitutional: Negative. HENT: Negative. Eyes: Negative. Respiratory: Negative. Cardiovascular: Negative. Gastrointestinal: Negative. Genitourinary: Negative. Musculoskeletal:  Positive for arthralgias. Skin: Negative. Neurological: Negative.

## 2023-10-18 ENCOUNTER — HOSPITAL ENCOUNTER (OUTPATIENT)
Age: 43
Discharge: HOME | End: 2023-10-18
Payer: COMMERCIAL

## 2023-10-18 DIAGNOSIS — Z00.00 PREVENTATIVE HEALTH CARE: ICD-10-CM

## 2023-10-18 LAB
ALBUMIN SERPL-MCNC: 4.3 G/DL (ref 3.5–5.2)
ALBUMIN/GLOB SERPL: 1.6 {RATIO} (ref 1–2.5)
ALP SERPL-CCNC: 87 U/L (ref 40–129)
ALT SERPL-CCNC: 10 U/L (ref 5–41)
ANION GAP SERPL CALCULATED.3IONS-SCNC: 9 MMOL/L (ref 9–17)
AST SERPL-CCNC: 12 U/L
BASOPHILS # BLD: 0.06 K/UL (ref 0–0.2)
BASOPHILS NFR BLD: 1 % (ref 0–2)
BILIRUB SERPL-MCNC: 0.6 MG/DL (ref 0.3–1.2)
BUN SERPL-MCNC: 19 MG/DL (ref 6–20)
BUN/CREAT SERPL: 24 (ref 9–20)
CALCIUM SERPL-MCNC: 9.3 MG/DL (ref 8.6–10.4)
CHLORIDE SERPL-SCNC: 104 MMOL/L (ref 98–107)
CHOLEST SERPL-MCNC: 184 MG/DL (ref 0–199)
CHOLESTEROL/HDL RATIO: 3
CO2 SERPL-SCNC: 26 MMOL/L (ref 20–31)
CREAT SERPL-MCNC: 0.8 MG/DL (ref 0.7–1.2)
EOSINOPHIL # BLD: 0.27 K/UL (ref 0–0.44)
EOSINOPHILS RELATIVE PERCENT: 3 % (ref 1–4)
ERYTHROCYTE [DISTWIDTH] IN BLOOD BY AUTOMATED COUNT: 13.2 % (ref 11.8–14.4)
GFR SERPL CREATININE-BSD FRML MDRD: >60 ML/MIN/1.73M2
GLUCOSE SERPL-MCNC: 106 MG/DL (ref 70–99)
HCT VFR BLD AUTO: 48.6 % (ref 40.7–50.3)
HDLC SERPL-MCNC: 57 MG/DL
HGB BLD-MCNC: 15.6 G/DL (ref 13–17)
IMM GRANULOCYTES # BLD AUTO: <0.03 K/UL (ref 0–0.3)
IMM GRANULOCYTES NFR BLD: 0 %
LDLC SERPL CALC-MCNC: 113 MG/DL (ref 0–100)
LYMPHOCYTES NFR BLD: 2.25 K/UL (ref 1.1–3.7)
LYMPHOCYTES RELATIVE PERCENT: 24 % (ref 24–43)
MCH RBC QN AUTO: 28.2 PG (ref 25.2–33.5)
MCHC RBC AUTO-ENTMCNC: 32.1 G/DL (ref 25.2–33.5)
MCV RBC AUTO: 87.9 FL (ref 82.6–102.9)
MONOCYTES NFR BLD: 0.86 K/UL (ref 0.1–1.2)
MONOCYTES NFR BLD: 9 % (ref 3–12)
NEUTROPHILS NFR BLD: 63 % (ref 36–65)
NEUTS SEG NFR BLD: 5.8 K/UL (ref 1.5–8.1)
NRBC BLD-RTO: 0 PER 100 WBC
PLATELET # BLD AUTO: 293 K/UL (ref 138–453)
PMV BLD AUTO: 9.7 FL (ref 8.1–13.5)
POTASSIUM SERPL-SCNC: 4.4 MMOL/L (ref 3.7–5.3)
PROT SERPL-MCNC: 7 G/DL (ref 6.4–8.3)
RBC # BLD AUTO: 5.53 M/UL (ref 4.21–5.77)
SODIUM SERPL-SCNC: 139 MMOL/L (ref 135–144)
TRIGL SERPL-MCNC: 72 MG/DL (ref 0–149)
VLDLC SERPL CALC-MCNC: 14 MG/DL
WBC OTHER # BLD: 9.3 K/UL (ref 3.5–11.3)

## 2023-10-18 PROCEDURE — 85025 COMPLETE CBC W/AUTO DIFF WBC: CPT

## 2023-10-18 PROCEDURE — 80053 COMPREHEN METABOLIC PANEL: CPT

## 2023-10-18 PROCEDURE — 80061 LIPID PANEL: CPT

## 2023-10-18 PROCEDURE — 36415 COLL VENOUS BLD VENIPUNCTURE: CPT

## 2023-10-31 ENCOUNTER — OFFICE VISIT (OUTPATIENT)
Dept: FAMILY MEDICINE CLINIC | Age: 43
End: 2023-10-31
Payer: COMMERCIAL

## 2023-10-31 VITALS
BODY MASS INDEX: 43.99 KG/M2 | WEIGHT: 297 LBS | DIASTOLIC BLOOD PRESSURE: 80 MMHG | SYSTOLIC BLOOD PRESSURE: 136 MMHG | HEIGHT: 69 IN | OXYGEN SATURATION: 96 % | HEART RATE: 73 BPM

## 2023-10-31 DIAGNOSIS — K58.2 IRRITABLE BOWEL SYNDROME WITH BOTH CONSTIPATION AND DIARRHEA: ICD-10-CM

## 2023-10-31 DIAGNOSIS — Z00.00 PREVENTATIVE HEALTH CARE: Primary | ICD-10-CM

## 2023-10-31 DIAGNOSIS — E66.01 CLASS 3 SEVERE OBESITY DUE TO EXCESS CALORIES WITHOUT SERIOUS COMORBIDITY WITH BODY MASS INDEX (BMI) OF 45.0 TO 49.9 IN ADULT (HCC): ICD-10-CM

## 2023-10-31 PROCEDURE — 1036F TOBACCO NON-USER: CPT | Performed by: FAMILY MEDICINE

## 2023-10-31 PROCEDURE — G8417 CALC BMI ABV UP PARAM F/U: HCPCS | Performed by: FAMILY MEDICINE

## 2023-10-31 PROCEDURE — 99396 PREV VISIT EST AGE 40-64: CPT | Performed by: FAMILY MEDICINE

## 2023-10-31 PROCEDURE — G8427 DOCREV CUR MEDS BY ELIG CLIN: HCPCS | Performed by: FAMILY MEDICINE

## 2023-10-31 PROCEDURE — G8484 FLU IMMUNIZE NO ADMIN: HCPCS | Performed by: FAMILY MEDICINE

## 2023-10-31 RX ORDER — BENZONATATE 200 MG/1
200 CAPSULE ORAL 3 TIMES DAILY PRN
Qty: 30 CAPSULE | Refills: 0 | Status: SHIPPED | OUTPATIENT
Start: 2023-10-31 | End: 2023-11-07

## 2023-10-31 RX ORDER — AZITHROMYCIN 250 MG/1
250 TABLET, FILM COATED ORAL SEE ADMIN INSTRUCTIONS
Qty: 6 TABLET | Refills: 0 | Status: SHIPPED | OUTPATIENT
Start: 2023-10-31 | End: 2023-11-05

## 2023-10-31 ASSESSMENT — ENCOUNTER SYMPTOMS
VOMITING: 0
COUGH: 1
ALLERGIC/IMMUNOLOGIC NEGATIVE: 1
BLOOD IN STOOL: 0
DIARRHEA: 1
CONSTIPATION: 1
EYES NEGATIVE: 1

## 2023-10-31 NOTE — PROGRESS NOTES
Subjective:      Patient ID: Matt Rocha is a 37 y.o. male. Knee Pain        Annual preventative exam with follow up on chronic medical conditions, refills, and review of updated labs. I have reviewed the patient's medical history in detail and updated the computerized patient record. IBS issues on review. He remains obese. He has gained a lot of wt. During covid 19 restrictions. Up to 319, coming back down now. No vomiting. Bowels frequently move up to 8 times a day. Workup in the past, including colonoscopy, were unrevealing. Comes and goes. Still gets some diarrhea off and on. Eating beans and saurkraut. Gave up pop. Some orange juice and 2 cups of coffee /day. Not having any recurrent diarrhea beyond some mild increase with coffee consumption. No abdominal pain at present. Past Medical History:   Diagnosis Date    Chronic abdominal pain     Chronic diarrhea     Constipation     Internal hemorrhoids     Obesity      Past Surgical History:   Procedure Laterality Date    APPENDECTOMY      COLONOSCOPY  2012    Internal hemorrhoids. WISDOM TOOTH EXTRACTION       Current Outpatient Medications   Medication Sig Dispense Refill    albuterol sulfate HFA (PROVENTIL HFA) 108 (90 Base) MCG/ACT inhaler Inhale 1-2 puffs into the lungs every 4 hours as needed for Wheezing 18 g 0    ibuprofen (ADVIL;MOTRIN) 800 MG tablet Take 1 tablet by mouth every 8 hours as needed for Pain (Patient not taking: Reported on 10/31/2023) 30 tablet 0     No current facility-administered medications for this visit. Allergies   Allergen Reactions    Prednisone      Increased heart rate. Social History     Tobacco Use    Smoking status: Former     Years: 1     Types: Cigarettes     Start date: 2002     Quit date: 2003     Years since quittin.1    Smokeless tobacco: Never   Substance Use Topics    Alcohol use: No     Alcohol/week: 0.0 standard drinks of alcohol    Drug use:  No

## 2023-10-31 NOTE — PATIENT INSTRUCTIONS
Hospital Outpatient Visit on 10/18/2023   Component Date Value Ref Range Status    Cholesterol 10/18/2023 184  0 - 199 mg/dL Final    Comment:    Cholesterol Guidelines:      <200  Desirable   200-240  Borderline      >240  Undesirable         HDL 10/18/2023 57  >40 mg/dL Final    Comment:    HDL Guidelines:    <40     Undesirable   40-59    Borderline    >59     Desirable         LDL Cholesterol 10/18/2023 113 (H)  0 - 100 mg/dL Final    Comment:    LDL Guidelines:     <100    Desirable   100-129   Near to/above Desirable   130-159   Borderline      >159   Undesirable     Direct (measured) LDL and calculated LDL are not interchangeable tests. Chol/HDL Ratio 10/18/2023 3.0   Final    Triglycerides 10/18/2023 72  0 - 149 mg/dL Final    Comment:    Triglyceride Guidelines:     <150   Desirable   150-199  Borderline   200-499  High     >499   Very high   Based on AHA Guidelines for fasting triglyceride, October 2012. VLDL 10/18/2023 14  mg/dL Final    Sodium 10/18/2023 139  135 - 144 mmol/L Final    Potassium 10/18/2023 4.4  3.7 - 5.3 mmol/L Final    Chloride 10/18/2023 104  98 - 107 mmol/L Final    CO2 10/18/2023 26  20 - 31 mmol/L Final    Anion Gap 10/18/2023 9  9 - 17 mmol/L Final    Glucose 10/18/2023 106 (H)  70 - 99 mg/dL Final    BUN 10/18/2023 19  6 - 20 mg/dL Final    Creatinine 10/18/2023 0.8  0.7 - 1.2 mg/dL Final    Est, Glom Filt Rate 10/18/2023 >60  >60 mL/min/1.73m2 Final    Comment:       These results are not intended for use in patients <25years of age. eGFR results are calculated without a race factor using the 2021 CKD-EPI equation. Careful clinical correlation is recommended, particularly when comparing to results   calculated using previous equations. The CKD-EPI equation is less accurate in patients with extremes of muscle mass, extra-renal   metabolism of creatine, excessive creatine ingestion, or following therapy that affects   renal tubular secretion.       Bun/Cre

## 2024-04-26 ENCOUNTER — HOSPITAL ENCOUNTER (OUTPATIENT)
Age: 44
Discharge: HOME | End: 2024-04-26
Payer: COMMERCIAL

## 2024-04-26 DIAGNOSIS — Z00.00 PREVENTATIVE HEALTH CARE: ICD-10-CM

## 2024-04-26 LAB
ANION GAP SERPL CALCULATED.3IONS-SCNC: 9 MMOL/L (ref 9–17)
BASOPHILS # BLD: 0.05 K/UL (ref 0–0.2)
BASOPHILS NFR BLD: 1 % (ref 0–2)
BUN SERPL-MCNC: 13 MG/DL (ref 6–20)
BUN/CREAT SERPL: 14 (ref 9–20)
CALCIUM SERPL-MCNC: 8.8 MG/DL (ref 8.6–10.4)
CHLORIDE SERPL-SCNC: 104 MMOL/L (ref 98–107)
CO2 SERPL-SCNC: 26 MMOL/L (ref 20–31)
CREAT SERPL-MCNC: 0.9 MG/DL (ref 0.7–1.2)
EOSINOPHIL # BLD: 0.19 K/UL (ref 0–0.44)
EOSINOPHILS RELATIVE PERCENT: 2 % (ref 1–4)
ERYTHROCYTE [DISTWIDTH] IN BLOOD BY AUTOMATED COUNT: 13.5 % (ref 11.8–14.4)
EST. AVERAGE GLUCOSE BLD GHB EST-MCNC: 108 MG/DL
GFR SERPL CREATININE-BSD FRML MDRD: >90 ML/MIN/1.73M2
GLUCOSE SERPL-MCNC: 90 MG/DL (ref 70–99)
HBA1C MFR BLD: 5.4 % (ref 4–6)
HCT VFR BLD AUTO: 46.2 % (ref 40.7–50.3)
HGB BLD-MCNC: 15.3 G/DL (ref 13–17)
IMM GRANULOCYTES # BLD AUTO: 0.03 K/UL (ref 0–0.3)
IMM GRANULOCYTES NFR BLD: 0 %
LYMPHOCYTES NFR BLD: 2.81 K/UL (ref 1.1–3.7)
LYMPHOCYTES RELATIVE PERCENT: 26 % (ref 24–43)
MCH RBC QN AUTO: 29 PG (ref 25.2–33.5)
MCHC RBC AUTO-ENTMCNC: 33.1 G/DL (ref 25.2–33.5)
MCV RBC AUTO: 87.5 FL (ref 82.6–102.9)
MONOCYTES NFR BLD: 0.88 K/UL (ref 0.1–1.2)
MONOCYTES NFR BLD: 8 % (ref 3–12)
NEUTROPHILS NFR BLD: 63 % (ref 36–65)
NEUTS SEG NFR BLD: 6.83 K/UL (ref 1.5–8.1)
NRBC BLD-RTO: 0 PER 100 WBC
PLATELET # BLD AUTO: 290 K/UL (ref 138–453)
PMV BLD AUTO: 9.5 FL (ref 8.1–13.5)
POTASSIUM SERPL-SCNC: 4.1 MMOL/L (ref 3.7–5.3)
RBC # BLD AUTO: 5.28 M/UL (ref 4.21–5.77)
SODIUM SERPL-SCNC: 139 MMOL/L (ref 135–144)
WBC OTHER # BLD: 10.8 K/UL (ref 3.5–11.3)

## 2024-04-26 PROCEDURE — 85025 COMPLETE CBC W/AUTO DIFF WBC: CPT

## 2024-04-26 PROCEDURE — 83036 HEMOGLOBIN GLYCOSYLATED A1C: CPT

## 2024-04-26 PROCEDURE — 36415 COLL VENOUS BLD VENIPUNCTURE: CPT

## 2024-04-26 PROCEDURE — 80048 BASIC METABOLIC PNL TOTAL CA: CPT

## 2024-04-27 ASSESSMENT — PATIENT HEALTH QUESTIONNAIRE - PHQ9
SUM OF ALL RESPONSES TO PHQ9 QUESTIONS 1 & 2: 0
SUM OF ALL RESPONSES TO PHQ QUESTIONS 1-9: 0
1. LITTLE INTEREST OR PLEASURE IN DOING THINGS: NOT AT ALL
SUM OF ALL RESPONSES TO PHQ9 QUESTIONS 1 & 2: 0
2. FEELING DOWN, DEPRESSED OR HOPELESS: NOT AT ALL
SUM OF ALL RESPONSES TO PHQ QUESTIONS 1-9: 0
2. FEELING DOWN, DEPRESSED OR HOPELESS: NOT AT ALL
1. LITTLE INTEREST OR PLEASURE IN DOING THINGS: NOT AT ALL

## 2024-04-30 ENCOUNTER — OFFICE VISIT (OUTPATIENT)
Dept: FAMILY MEDICINE CLINIC | Age: 44
End: 2024-04-30
Payer: COMMERCIAL

## 2024-04-30 VITALS
HEART RATE: 73 BPM | OXYGEN SATURATION: 97 % | WEIGHT: 295.2 LBS | DIASTOLIC BLOOD PRESSURE: 74 MMHG | BODY MASS INDEX: 43.72 KG/M2 | SYSTOLIC BLOOD PRESSURE: 136 MMHG | HEIGHT: 69 IN

## 2024-04-30 DIAGNOSIS — E66.01 CLASS 3 SEVERE OBESITY DUE TO EXCESS CALORIES WITHOUT SERIOUS COMORBIDITY WITH BODY MASS INDEX (BMI) OF 45.0 TO 49.9 IN ADULT (HCC): Primary | ICD-10-CM

## 2024-04-30 DIAGNOSIS — K58.2 IRRITABLE BOWEL SYNDROME WITH BOTH CONSTIPATION AND DIARRHEA: ICD-10-CM

## 2024-04-30 DIAGNOSIS — R73.01 IMPAIRED FASTING BLOOD SUGAR: ICD-10-CM

## 2024-04-30 PROCEDURE — G8427 DOCREV CUR MEDS BY ELIG CLIN: HCPCS | Performed by: FAMILY MEDICINE

## 2024-04-30 PROCEDURE — 99214 OFFICE O/P EST MOD 30 MIN: CPT | Performed by: FAMILY MEDICINE

## 2024-04-30 PROCEDURE — G8417 CALC BMI ABV UP PARAM F/U: HCPCS | Performed by: FAMILY MEDICINE

## 2024-04-30 PROCEDURE — 1036F TOBACCO NON-USER: CPT | Performed by: FAMILY MEDICINE

## 2024-04-30 ASSESSMENT — ENCOUNTER SYMPTOMS
RESPIRATORY NEGATIVE: 1
VOMITING: 0
CONSTIPATION: 1
ALLERGIC/IMMUNOLOGIC NEGATIVE: 1
COUGH: 0
BLOOD IN STOOL: 0
DIARRHEA: 1
EYES NEGATIVE: 1

## 2024-04-30 NOTE — PROGRESS NOTES
Subjective:      Patient ID: Luis Lockhart is a 44 y.o. male.    Knee Pain         Routine follow up on chronic medical conditions, refills, and review of updated labs.    I have reviewed the patient's medical history in detail and updated the computerized patient record.    IBS issues on review.  He remains obese.  He has gained a lot of wt. During covid 19 restrictions.  Up to 319, coming back down now.  No vomiting.  Bowels frequently move up to 8 times a day.  Workup in the past, including colonoscopy, were unrevealing.  Comes and goes.  Still gets some diarrhea off and on. Eating beans and saurkraut.  Gave up pop. Some orange juice and 2 cups of coffee /day. Not having any recurrent diarrhea beyond some mild increase with coffee consumption.     No abdominal pain at present.      Wt. Loss variable.  Still trying to reduce wt.    Past Medical History:   Diagnosis Date    Chronic abdominal pain     Chronic diarrhea     Constipation     Internal hemorrhoids     Obesity      Past Surgical History:   Procedure Laterality Date    APPENDECTOMY      COLONOSCOPY  2012    Internal hemorrhoids.    WISDOM TOOTH EXTRACTION       Current Outpatient Medications   Medication Sig Dispense Refill    ibuprofen (ADVIL;MOTRIN) 800 MG tablet Take 1 tablet by mouth every 8 hours as needed for Pain (Patient not taking: Reported on 10/31/2023) 30 tablet 0    albuterol sulfate HFA (PROVENTIL HFA) 108 (90 Base) MCG/ACT inhaler Inhale 1-2 puffs into the lungs every 4 hours as needed for Wheezing (Patient not taking: Reported on 2024) 18 g 0     No current facility-administered medications for this visit.     Allergies   Allergen Reactions    Prednisone      Increased heart rate.     Social History     Tobacco Use    Smoking status: Former     Current packs/day: 0.00     Types: Cigarettes     Start date: 2002     Quit date: 2003     Years since quittin.6    Smokeless tobacco: Never   Substance Use Topics

## 2024-04-30 NOTE — PATIENT INSTRUCTIONS
36 - 65 % Final    Lymphocytes % 04/26/2024 26  24 - 43 % Final    Monocytes % 04/26/2024 8  3 - 12 % Final    Eosinophils % 04/26/2024 2  1 - 4 % Final    Basophils % 04/26/2024 1  0 - 2 % Final    Immature Granulocytes % 04/26/2024 0  0 % Final    Neutrophils Absolute 04/26/2024 6.83  1.50 - 8.10 k/uL Final    Lymphocytes Absolute 04/26/2024 2.81  1.10 - 3.70 k/uL Final    Monocytes Absolute 04/26/2024 0.88  0.10 - 1.20 k/uL Final    Eosinophils Absolute 04/26/2024 0.19  0.00 - 0.44 k/uL Final    Basophils Absolute 04/26/2024 0.05  0.00 - 0.20 k/uL Final    Immature Granulocytes Absolute 04/26/2024 0.03  0.00 - 0.30 k/uL Final

## 2024-05-08 ENCOUNTER — OFFICE VISIT (OUTPATIENT)
Dept: PRIMARY CARE CLINIC | Age: 44
End: 2024-05-08
Payer: COMMERCIAL

## 2024-05-08 VITALS
WEIGHT: 291 LBS | TEMPERATURE: 98.9 F | HEART RATE: 70 BPM | DIASTOLIC BLOOD PRESSURE: 80 MMHG | OXYGEN SATURATION: 97 % | SYSTOLIC BLOOD PRESSURE: 110 MMHG | HEIGHT: 69 IN | BODY MASS INDEX: 43.1 KG/M2

## 2024-05-08 DIAGNOSIS — R05.9 COUGH, UNSPECIFIED TYPE: ICD-10-CM

## 2024-05-08 DIAGNOSIS — J40 BRONCHITIS: Primary | ICD-10-CM

## 2024-05-08 LAB
INFLUENZA A ANTIGEN, POC: NEGATIVE
INFLUENZA B ANTIGEN, POC: NEGATIVE
LOT EXPIRE DATE: NORMAL
LOT KIT NUMBER: NORMAL
SARS-COV-2, POC: NORMAL
VALID INTERNAL CONTROL: NORMAL
VENDOR AND KIT NAME POC: NORMAL

## 2024-05-08 PROCEDURE — 1036F TOBACCO NON-USER: CPT | Performed by: FAMILY MEDICINE

## 2024-05-08 PROCEDURE — G8427 DOCREV CUR MEDS BY ELIG CLIN: HCPCS | Performed by: FAMILY MEDICINE

## 2024-05-08 PROCEDURE — G8417 CALC BMI ABV UP PARAM F/U: HCPCS | Performed by: FAMILY MEDICINE

## 2024-05-08 PROCEDURE — 99213 OFFICE O/P EST LOW 20 MIN: CPT | Performed by: FAMILY MEDICINE

## 2024-05-08 PROCEDURE — 87428 SARSCOV & INF VIR A&B AG IA: CPT | Performed by: FAMILY MEDICINE

## 2024-05-08 RX ORDER — ALBUTEROL SULFATE 90 UG/1
1-2 AEROSOL, METERED RESPIRATORY (INHALATION) EVERY 4 HOURS PRN
Qty: 18 G | Refills: 0 | Status: SHIPPED | OUTPATIENT
Start: 2024-05-08

## 2024-05-08 RX ORDER — BENZONATATE 100 MG/1
100-200 CAPSULE ORAL 3 TIMES DAILY PRN
Qty: 60 CAPSULE | Refills: 0 | Status: SHIPPED | OUTPATIENT
Start: 2024-05-08 | End: 2024-05-15

## 2024-05-08 RX ORDER — DOXYCYCLINE HYCLATE 100 MG
100 TABLET ORAL 2 TIMES DAILY
Qty: 20 TABLET | Refills: 0 | Status: SHIPPED | OUTPATIENT
Start: 2024-05-08 | End: 2024-05-18

## 2024-05-08 NOTE — PROGRESS NOTES
[place] [address]                        Telephone [phone]             Fax [phone]       Luis Lockhart  :  1980  Age:  44 y.o.   MRN:  [id number]  Date of visit:  2024       Assessment & Plan:    Bronchitis  I reviewed the results of testing done today with the patient.  Doxycycline, Albuterol, and Tessalon were prescribed:  - doxycycline hyclate (VIBRA-TABS) 100 MG tablet; Take 1 tablet by mouth 2 times daily for 10 days  Dispense: 20 tablet; Refill: 0  - albuterol sulfate HFA (PROVENTIL HFA) 108 (90 Base) MCG/ACT inhaler; Inhale 1-2 puffs into the lungs every 4 hours as needed for Wheezing  Dispense: 18 g; Refill: 0  - benzonatate (TESSALON) 100 MG capsule; Take 1-2 capsules by mouth 3 times daily as needed for Cough  Dispense: 60 capsule; Refill: 0    He was given a letter for work.    He was advised to follow up if symptoms worsen or do not resolve.           Subjective:    Luis Lockhart is a 44 y.o. male who presents to [facility] today (2024) for evaluation of:  Sinus Problem (Pt has had sx for 3 days post nasal drip,chest congestion ,HA, feels drained. Yellow drainage, the coughing is not letting him sleep.)      He reports cough and congestion for approximately 3 days.  His symptoms are worse today.   He has had sneezing.   His cough is productive.  He has take over the counter medications without any significant improvement in his symptoms.      He does not take any prescription medications currently.        He is allergic to prednisone.    He has the following problem list:  Patient Active Problem List   Diagnosis    Chronic abdominal pain    Obesity        He  reports that he quit smoking about 20 years ago. His smoking use included cigarettes. He started smoking about 21 years ago. He has never used smokeless tobacco.      Objective:    Vitals:

## 2024-11-08 ENCOUNTER — HOSPITAL ENCOUNTER (OUTPATIENT)
Age: 44
Discharge: HOME | End: 2024-11-08
Payer: COMMERCIAL

## 2024-11-08 DIAGNOSIS — R73.01 IMPAIRED FASTING BLOOD SUGAR: ICD-10-CM

## 2024-11-08 LAB
ALBUMIN SERPL-MCNC: 4.5 G/DL (ref 3.5–5.2)
ALBUMIN/GLOB SERPL: 1.6 {RATIO} (ref 1–2.5)
ALP SERPL-CCNC: 90 U/L (ref 40–129)
ALT SERPL-CCNC: 17 U/L (ref 5–41)
ANION GAP SERPL CALCULATED.3IONS-SCNC: 9 MMOL/L (ref 9–17)
AST SERPL-CCNC: 16 U/L
BASOPHILS # BLD: 0.04 K/UL (ref 0–0.2)
BASOPHILS NFR BLD: 0 % (ref 0–2)
BILIRUB SERPL-MCNC: 0.8 MG/DL (ref 0.3–1.2)
BUN SERPL-MCNC: 12 MG/DL (ref 6–20)
BUN/CREAT SERPL: 15 (ref 9–20)
CALCIUM SERPL-MCNC: 9.1 MG/DL (ref 8.6–10.4)
CHLORIDE SERPL-SCNC: 104 MMOL/L (ref 98–107)
CO2 SERPL-SCNC: 27 MMOL/L (ref 20–31)
CREAT SERPL-MCNC: 0.8 MG/DL (ref 0.7–1.2)
EOSINOPHIL # BLD: 0.26 K/UL (ref 0–0.44)
EOSINOPHILS RELATIVE PERCENT: 2 % (ref 1–4)
ERYTHROCYTE [DISTWIDTH] IN BLOOD BY AUTOMATED COUNT: 13 % (ref 11.8–14.4)
EST. AVERAGE GLUCOSE BLD GHB EST-MCNC: 105 MG/DL
GFR, ESTIMATED: >90 ML/MIN/1.73M2
GLUCOSE SERPL-MCNC: 90 MG/DL (ref 70–99)
HBA1C MFR BLD: 5.3 % (ref 4–6)
HCT VFR BLD AUTO: 45.8 % (ref 40.7–50.3)
HGB BLD-MCNC: 15.6 G/DL (ref 13–17)
IMM GRANULOCYTES # BLD AUTO: 0.04 K/UL (ref 0–0.3)
IMM GRANULOCYTES NFR BLD: 0 %
LYMPHOCYTES NFR BLD: 2.75 K/UL (ref 1.1–3.7)
LYMPHOCYTES RELATIVE PERCENT: 24 % (ref 24–43)
MCH RBC QN AUTO: 29.4 PG (ref 25.2–33.5)
MCHC RBC AUTO-ENTMCNC: 34.1 G/DL (ref 25.2–33.5)
MCV RBC AUTO: 86.4 FL (ref 82.6–102.9)
MONOCYTES NFR BLD: 0.96 K/UL (ref 0.1–1.2)
MONOCYTES NFR BLD: 9 % (ref 3–12)
NEUTROPHILS NFR BLD: 65 % (ref 36–65)
NEUTS SEG NFR BLD: 7.26 K/UL (ref 1.5–8.1)
NRBC BLD-RTO: 0 PER 100 WBC
PLATELET # BLD AUTO: 285 K/UL (ref 138–453)
PMV BLD AUTO: 9.7 FL (ref 8.1–13.5)
POTASSIUM SERPL-SCNC: 4.1 MMOL/L (ref 3.7–5.3)
PROT SERPL-MCNC: 7.4 G/DL (ref 6.4–8.3)
RBC # BLD AUTO: 5.3 M/UL (ref 4.21–5.77)
SODIUM SERPL-SCNC: 140 MMOL/L (ref 135–144)
WBC OTHER # BLD: 11.3 K/UL (ref 3.5–11.3)

## 2024-11-08 PROCEDURE — 85025 COMPLETE CBC W/AUTO DIFF WBC: CPT

## 2024-11-08 PROCEDURE — 83036 HEMOGLOBIN GLYCOSYLATED A1C: CPT

## 2024-11-08 PROCEDURE — 80053 COMPREHEN METABOLIC PANEL: CPT

## 2024-11-08 PROCEDURE — 36415 COLL VENOUS BLD VENIPUNCTURE: CPT

## 2024-11-11 ENCOUNTER — OFFICE VISIT (OUTPATIENT)
Dept: FAMILY MEDICINE CLINIC | Age: 44
End: 2024-11-11

## 2024-11-11 VITALS
SYSTOLIC BLOOD PRESSURE: 132 MMHG | OXYGEN SATURATION: 98 % | WEIGHT: 289 LBS | HEIGHT: 69 IN | DIASTOLIC BLOOD PRESSURE: 80 MMHG | BODY MASS INDEX: 42.8 KG/M2 | HEART RATE: 66 BPM

## 2024-11-11 DIAGNOSIS — Z00.00 PREVENTATIVE HEALTH CARE: ICD-10-CM

## 2024-11-11 DIAGNOSIS — R73.01 IMPAIRED FASTING BLOOD SUGAR: Primary | ICD-10-CM

## 2024-11-11 DIAGNOSIS — E66.01 CLASS 3 SEVERE OBESITY DUE TO EXCESS CALORIES WITHOUT SERIOUS COMORBIDITY WITH BODY MASS INDEX (BMI) OF 40.0 TO 44.9 IN ADULT: ICD-10-CM

## 2024-11-11 DIAGNOSIS — E66.813 CLASS 3 SEVERE OBESITY DUE TO EXCESS CALORIES WITHOUT SERIOUS COMORBIDITY WITH BODY MASS INDEX (BMI) OF 40.0 TO 44.9 IN ADULT: ICD-10-CM

## 2024-11-11 SDOH — ECONOMIC STABILITY: INCOME INSECURITY: HOW HARD IS IT FOR YOU TO PAY FOR THE VERY BASICS LIKE FOOD, HOUSING, MEDICAL CARE, AND HEATING?: NOT HARD AT ALL

## 2024-11-11 SDOH — ECONOMIC STABILITY: FOOD INSECURITY: WITHIN THE PAST 12 MONTHS, YOU WORRIED THAT YOUR FOOD WOULD RUN OUT BEFORE YOU GOT MONEY TO BUY MORE.: NEVER TRUE

## 2024-11-11 SDOH — ECONOMIC STABILITY: FOOD INSECURITY: WITHIN THE PAST 12 MONTHS, THE FOOD YOU BOUGHT JUST DIDN'T LAST AND YOU DIDN'T HAVE MONEY TO GET MORE.: NEVER TRUE

## 2024-11-11 ASSESSMENT — ENCOUNTER SYMPTOMS
VOMITING: 0
CONSTIPATION: 1
EYES NEGATIVE: 1
RESPIRATORY NEGATIVE: 1
BLOOD IN STOOL: 0
COUGH: 0
ALLERGIC/IMMUNOLOGIC NEGATIVE: 1
DIARRHEA: 1

## 2024-11-11 NOTE — PROGRESS NOTES
Subjective:      Patient ID: Luis Lockhart is a 44 y.o. male.    Knee Pain         Routine annual preventative visit with follow up on chronic medical conditions, refills, and review of updated labs.    I have reviewed the patient's medical history in detail and updated the computerized patient record.    IBS issues on review.  He remains obese.  He has gained a lot of wt. During covid 19 restrictions.  Up to 319, coming back down.  Down to 268 in July. Has had some stress and fell off the diet again.    No vomiting.  Bowels frequently move up to 8 times a day.  Workup in the past, including colonoscopy, were unrevealing.  Comes and goes.  Still gets some diarrhea off and on. Eating beans and saurkraut.  Gave up pop. Some orange juice and 2 cups of coffee /day. Not having any recurrent diarrhea beyond some mild increase with coffee consumption.     No abdominal pain at present.      Wt. Loss variable.  Still trying to reduce wt.       Past Medical History:   Diagnosis Date    Chronic abdominal pain     Chronic diarrhea     Constipation     Internal hemorrhoids     Obesity      Past Surgical History:   Procedure Laterality Date    APPENDECTOMY      COLONOSCOPY  2012    Internal hemorrhoids.    WISDOM TOOTH EXTRACTION       No current outpatient medications on file.     No current facility-administered medications for this visit.     Allergies   Allergen Reactions    Prednisone      Increased heart rate.     Social History     Tobacco Use    Smoking status: Former     Current packs/day: 0.00     Types: Cigarettes     Start date: 2002     Quit date: 2003     Years since quittin.1    Smokeless tobacco: Never   Substance Use Topics    Alcohol use: No     Alcohol/week: 0.0 standard drinks of alcohol    Drug use: No     Family History   Problem Relation Age of Onset    Colon Cancer Maternal Grandfather     Obesity Father     Diabetes Father         type 2    High Blood Pressure Father

## 2024-11-11 NOTE — PATIENT INSTRUCTIONS
Hospital Outpatient Visit on 11/08/2024   Component Date Value Ref Range Status    Sodium 11/08/2024 140  135 - 144 mmol/L Final    Potassium 11/08/2024 4.1  3.7 - 5.3 mmol/L Final    Chloride 11/08/2024 104  98 - 107 mmol/L Final    CO2 11/08/2024 27  20 - 31 mmol/L Final    Anion Gap 11/08/2024 9  9 - 17 mmol/L Final    Glucose 11/08/2024 90  70 - 99 mg/dL Final    BUN 11/08/2024 12  6 - 20 mg/dL Final    Creatinine 11/08/2024 0.8  0.7 - 1.2 mg/dL Final    Est, Glom Filt Rate 11/08/2024 >90  >60 mL/min/1.73m2 Final    Comment:       These results are not intended for use in patients <18 years of age.        eGFR results are calculated without a race factor using the 2021 CKD-EPI equation.  Careful clinical correlation is recommended, particularly when comparing to results   calculated using previous equations.  The CKD-EPI equation is less accurate in patients with extremes of muscle mass, extra-renal   metabolism of creatine, excessive creatine ingestion, or following therapy that affects   renal tubular secretion.      BUN/Creatinine Ratio 11/08/2024 15  9 - 20 Final    Calcium 11/08/2024 9.1  8.6 - 10.4 mg/dL Final    Total Protein 11/08/2024 7.4  6.4 - 8.3 g/dL Final    Albumin 11/08/2024 4.5  3.5 - 5.2 g/dL Final    Albumin/Globulin Ratio 11/08/2024 1.6  1.0 - 2.5 Final    Total Bilirubin 11/08/2024 0.8  0.3 - 1.2 mg/dL Final    Alkaline Phosphatase 11/08/2024 90  40 - 129 U/L Final    ALT 11/08/2024 17  5 - 41 U/L Final    AST 11/08/2024 16  <40 U/L Final    WBC 11/08/2024 11.3  3.5 - 11.3 k/uL Final    RBC 11/08/2024 5.30  4.21 - 5.77 m/uL Final    Hemoglobin 11/08/2024 15.6  13.0 - 17.0 g/dL Final    Hematocrit 11/08/2024 45.8  40.7 - 50.3 % Final    MCV 11/08/2024 86.4  82.6 - 102.9 fL Final    MCH 11/08/2024 29.4  25.2 - 33.5 pg Final    MCHC 11/08/2024 34.1 (H)  25.2 - 33.5 g/dL Final    RDW 11/08/2024 13.0  11.8 - 14.4 % Final    Platelets 11/08/2024 285  138 - 453 k/uL Final    MPV 11/08/2024 9.7  8.1

## 2025-04-03 ENCOUNTER — OFFICE VISIT (OUTPATIENT)
Dept: PRIMARY CARE CLINIC | Age: 45
End: 2025-04-03
Payer: COMMERCIAL

## 2025-04-03 ENCOUNTER — RESULTS FOLLOW-UP (OUTPATIENT)
Dept: PRIMARY CARE CLINIC | Age: 45
End: 2025-04-03

## 2025-04-03 VITALS
DIASTOLIC BLOOD PRESSURE: 104 MMHG | OXYGEN SATURATION: 97 % | RESPIRATION RATE: 20 BRPM | HEART RATE: 74 BPM | BODY MASS INDEX: 45.91 KG/M2 | TEMPERATURE: 98.5 F | WEIGHT: 310 LBS | HEIGHT: 69 IN | SYSTOLIC BLOOD PRESSURE: 138 MMHG

## 2025-04-03 DIAGNOSIS — R05.1 ACUTE COUGH: ICD-10-CM

## 2025-04-03 DIAGNOSIS — J40 BRONCHITIS: Primary | ICD-10-CM

## 2025-04-03 LAB — S PYO AG THROAT QL: NORMAL

## 2025-04-03 PROCEDURE — 87880 STREP A ASSAY W/OPTIC: CPT

## 2025-04-03 PROCEDURE — 1036F TOBACCO NON-USER: CPT

## 2025-04-03 PROCEDURE — G8417 CALC BMI ABV UP PARAM F/U: HCPCS

## 2025-04-03 PROCEDURE — G8427 DOCREV CUR MEDS BY ELIG CLIN: HCPCS

## 2025-04-03 PROCEDURE — 99213 OFFICE O/P EST LOW 20 MIN: CPT

## 2025-04-03 RX ORDER — DOXYCYCLINE HYCLATE 100 MG
100 TABLET ORAL 2 TIMES DAILY
Qty: 14 TABLET | Refills: 0 | Status: SHIPPED | OUTPATIENT
Start: 2025-04-03 | End: 2025-04-10

## 2025-04-03 RX ORDER — BENZONATATE 100 MG/1
100 CAPSULE ORAL 2 TIMES DAILY PRN
Qty: 14 CAPSULE | Refills: 0 | Status: SHIPPED | OUTPATIENT
Start: 2025-04-03 | End: 2025-04-10

## 2025-04-03 SDOH — ECONOMIC STABILITY: FOOD INSECURITY: WITHIN THE PAST 12 MONTHS, THE FOOD YOU BOUGHT JUST DIDN'T LAST AND YOU DIDN'T HAVE MONEY TO GET MORE.: NEVER TRUE

## 2025-04-03 SDOH — ECONOMIC STABILITY: FOOD INSECURITY: WITHIN THE PAST 12 MONTHS, YOU WORRIED THAT YOUR FOOD WOULD RUN OUT BEFORE YOU GOT MONEY TO BUY MORE.: NEVER TRUE

## 2025-04-03 ASSESSMENT — ENCOUNTER SYMPTOMS
NAUSEA: 0
COUGH: 1
WHEEZING: 1
SINUS PAIN: 0
COLOR CHANGE: 0
CONSTIPATION: 0
SINUS PRESSURE: 0
RHINORRHEA: 1
VOMITING: 0
CHEST TIGHTNESS: 1
DIARRHEA: 0
ABDOMINAL PAIN: 0
SORE THROAT: 1

## 2025-04-03 ASSESSMENT — PATIENT HEALTH QUESTIONNAIRE - PHQ9
SUM OF ALL RESPONSES TO PHQ QUESTIONS 1-9: 0
1. LITTLE INTEREST OR PLEASURE IN DOING THINGS: NOT AT ALL
2. FEELING DOWN, DEPRESSED OR HOPELESS: NOT AT ALL
SUM OF ALL RESPONSES TO PHQ QUESTIONS 1-9: 0

## 2025-04-03 NOTE — PROGRESS NOTES
[facility]  [address]  [facility] [address]  Dept: [phone]  Dept Fax: [phone]    Luis Lockhart is a 45 y.o. male who presents today for his medical conditions/complaints as noted below. Luis Lockhart is c/o of   Chief Complaint   Patient presents with    Cough     Pt ill 1 week with cough productive yellowish-brown, sore throat, headaches, full ears.    .    HPI:     Wife was also sick, but is feeling better. He is eating and drinking okay. Denies any fevers, nausea/vomiting, or diarrhea.    Cold Symptoms   This is a new problem. Episode onset: 1 week ago. The problem has been gradually worsening. There has been no fever. Associated symptoms include congestion, coughing, ear pain, rhinorrhea, a sore throat and wheezing. Pertinent negatives include no abdominal pain, chest pain, diarrhea, nausea, sinus pain or vomiting. Rash: Yellow-brown sputum.Treatments tried: Mucinex, Tylenol Cold & Flu. The treatment provided mild relief.         Past Medical History:   Diagnosis Date    Chronic abdominal pain     Chronic diarrhea     Constipation     Internal hemorrhoids     Obesity      Past Surgical History:   Procedure Laterality Date    APPENDECTOMY      COLONOSCOPY  2012    Internal hemorrhoids.    WISDOM TOOTH EXTRACTION         Family History   Problem Relation Age of Onset    Colon Cancer Maternal Grandfather     Obesity Father     Diabetes Father         type 2    High Blood Pressure Father     Arthritis Brother         rheumatoid    Crohn's Disease Other        Social History     Tobacco Use    Smoking status: Former     Current packs/day: 0.00     Types: Cigarettes     Start date: 2002     Quit date: 2003     Years since quittin.5    Smokeless tobacco: Never   Substance Use Topics    Alcohol use: No     Alcohol/week: 0.0 standard drinks of

## 2025-04-03 NOTE — PATIENT INSTRUCTIONS
Take medication as prescribed  Begin and complete full course of antibiotics  Continue Mucinex and Tylenol Cold & flu medication  Increase fluids  Rest, avoid activities that increase exertion  If symptoms worsen follow up with PCP or return to walk in clinic

## 2025-05-22 ENCOUNTER — TELEPHONE (OUTPATIENT)
Dept: FAMILY MEDICINE CLINIC | Age: 45
End: 2025-05-22

## 2025-06-18 ENCOUNTER — OFFICE VISIT (OUTPATIENT)
Dept: PRIMARY CARE CLINIC | Age: 45
End: 2025-06-18
Payer: COMMERCIAL

## 2025-06-18 VITALS
TEMPERATURE: 97.9 F | BODY MASS INDEX: 44.75 KG/M2 | WEIGHT: 303 LBS | DIASTOLIC BLOOD PRESSURE: 88 MMHG | OXYGEN SATURATION: 97 % | HEART RATE: 75 BPM | SYSTOLIC BLOOD PRESSURE: 118 MMHG

## 2025-06-18 DIAGNOSIS — S61.211A LACERATION OF LEFT INDEX FINGER WITHOUT FOREIGN BODY WITHOUT DAMAGE TO NAIL, INITIAL ENCOUNTER: Primary | ICD-10-CM

## 2025-06-18 PROCEDURE — 90715 TDAP VACCINE 7 YRS/> IM: CPT

## 2025-06-18 PROCEDURE — 1036F TOBACCO NON-USER: CPT

## 2025-06-18 PROCEDURE — 99213 OFFICE O/P EST LOW 20 MIN: CPT

## 2025-06-18 PROCEDURE — G8427 DOCREV CUR MEDS BY ELIG CLIN: HCPCS

## 2025-06-18 PROCEDURE — G8417 CALC BMI ABV UP PARAM F/U: HCPCS

## 2025-06-18 PROCEDURE — 90471 IMMUNIZATION ADMIN: CPT

## 2025-06-18 NOTE — PROGRESS NOTES
[facility] department of [facility]  [address] [facility] [address]  Phone: [phone]  Fax: [phone]      Luis Lockhart  1980  MRN: [id number]  Date of visit: 6/18/2025    Chief Complaint:     Luis Lockhrat is here for c/o of Laceration (Sliced it with a knife)      HPI:     Luis Lockhart is a 45 y.o. male who presents to the [facility] today for his medical conditions/complaints as noted below.    Laceration   The incident occurred less than 1 hour ago. The laceration is located on the Left hand. The laceration mechanism was a clean knife. The pain is mild. He reports no foreign bodies present.   Patient cut his left pointer finger with a new kitchen knife. There was a lot of blood loss.     Past Medical History:   Diagnosis Date    Chronic abdominal pain     Chronic diarrhea     Constipation     Internal hemorrhoids     Obesity         Allergies   Allergen Reactions    Prednisone      Increased heart rate.         Subjective:      Review of Systems   Skin:  Positive for wound.       Objective:     Vitals:    06/18/25 1546   BP: 118/88   Pulse: 75   Temp: 97.9 °F (36.6 °C)   TempSrc: Tympanic   SpO2: 97%   Weight: (!) 137.4 kg (303 lb)     Body mass index is 44.75 kg/m².    Physical Exam  Vitals and nursing note reviewed.   Constitutional:       Appearance: Normal appearance. He is not ill-appearing.   HENT:      Head: Normocephalic and atraumatic.      Nose: Nose normal.   Eyes:      Conjunctiva/sclera: Conjunctivae normal.   Cardiovascular:      Rate and Rhythm: Normal rate and regular rhythm.      Heart sounds: Normal heart sounds. No murmur heard.     No friction rub. No gallop.   Pulmonary:      Effort: Pulmonary effort is normal.      Breath sounds: Normal breath sounds. No wheezing or rhonchi.   Skin:     General: Skin is warm and dry.      Findings: Laceration present. No rash.      Comments: 2 minor lacerations to the

## 2025-06-18 NOTE — PATIENT INSTRUCTIONS
Tetanus shot given today  Clean hand with antibacterial hand soap and water  May use tylenol and ibuprofen for pain/ fever  If symptoms worsen follow up with PCP or return to walk in clinic  Patient verbalized understanding and agrees with plan of care